# Patient Record
Sex: MALE | Race: BLACK OR AFRICAN AMERICAN | NOT HISPANIC OR LATINO | Employment: OTHER | URBAN - METROPOLITAN AREA
[De-identification: names, ages, dates, MRNs, and addresses within clinical notes are randomized per-mention and may not be internally consistent; named-entity substitution may affect disease eponyms.]

---

## 2018-01-03 ENCOUNTER — HISTORICAL (OUTPATIENT)
Dept: ADMINISTRATIVE | Facility: HOSPITAL | Age: 53
End: 2018-01-03

## 2018-01-10 LAB
FINAL CULTURE: NORMAL
FINAL CULTURE: NORMAL

## 2019-11-27 ENCOUNTER — HISTORICAL (OUTPATIENT)
Dept: ADMINISTRATIVE | Facility: HOSPITAL | Age: 54
End: 2019-11-27

## 2019-11-27 LAB
ABS NEUT (OLG): 2.43 X10(3)/MCL (ref 2.1–9.2)
ALBUMIN SERPL-MCNC: 4 GM/DL (ref 3.4–5)
ALBUMIN/GLOB SERPL: 0.9 RATIO (ref 1.1–2)
ALP SERPL-CCNC: 116 UNIT/L (ref 45–117)
ALT SERPL-CCNC: 24 UNIT/L (ref 12–78)
AST SERPL-CCNC: 19 UNIT/L (ref 15–37)
BASOPHILS # BLD AUTO: 0 X10(3)/MCL (ref 0–0.2)
BASOPHILS NFR BLD AUTO: 1 %
BILIRUB SERPL-MCNC: 0.5 MG/DL (ref 0.2–1)
BILIRUBIN DIRECT+TOT PNL SERPL-MCNC: 0.1 MG/DL (ref 0–0.2)
BILIRUBIN DIRECT+TOT PNL SERPL-MCNC: 0.4 MG/DL
BUN SERPL-MCNC: 20 MG/DL (ref 7–18)
CALCIUM SERPL-MCNC: 8.6 MG/DL (ref 8.5–10.1)
CHLORIDE SERPL-SCNC: 107 MMOL/L (ref 98–107)
CO2 SERPL-SCNC: 30 MMOL/L (ref 21–32)
CREAT SERPL-MCNC: 1.1 MG/DL (ref 0.6–1.3)
EOSINOPHIL # BLD AUTO: 0.2 X10(3)/MCL (ref 0–0.9)
EOSINOPHIL NFR BLD AUTO: 3 %
ERYTHROCYTE [DISTWIDTH] IN BLOOD BY AUTOMATED COUNT: 13.2 % (ref 11.5–14.5)
GLOBULIN SER-MCNC: 4.3 GM/ML (ref 2.3–3.5)
GLUCOSE SERPL-MCNC: 116 MG/DL (ref 74–106)
HCT VFR BLD AUTO: 49.6 % (ref 40–51)
HGB BLD-MCNC: 16 GM/DL (ref 13.5–17.5)
IMM GRANULOCYTES # BLD AUTO: 0.01 10*3/UL
IMM GRANULOCYTES NFR BLD AUTO: 0 %
LYMPHOCYTES # BLD AUTO: 2.1 X10(3)/MCL (ref 0.6–4.6)
LYMPHOCYTES NFR BLD AUTO: 39 %
MCH RBC QN AUTO: 30.4 PG (ref 26–34)
MCHC RBC AUTO-ENTMCNC: 32.3 GM/DL (ref 31–37)
MCV RBC AUTO: 94.3 FL (ref 80–100)
MONOCYTES # BLD AUTO: 0.6 X10(3)/MCL (ref 0.1–1.3)
MONOCYTES NFR BLD AUTO: 11 %
NEUTROPHILS # BLD AUTO: 2.43 X10(3)/MCL (ref 2.1–9.2)
NEUTROPHILS NFR BLD AUTO: 46 %
PLATELET # BLD AUTO: 193 X10(3)/MCL (ref 130–400)
PMV BLD AUTO: 11.1 FL (ref 7.4–10.4)
POTASSIUM SERPL-SCNC: 4.4 MMOL/L (ref 3.5–5.1)
PROT SERPL-MCNC: 8.3 GM/DL (ref 6.4–8.2)
RBC # BLD AUTO: 5.26 X10(6)/MCL (ref 4.5–5.9)
SODIUM SERPL-SCNC: 140 MMOL/L (ref 136–145)
T4 FREE SERPL-MCNC: 0.8 NG/DL (ref 0.76–1.46)
TSH SERPL-ACNC: 1.18 MIU/L (ref 0.36–3.74)
WBC # SPEC AUTO: 5.4 X10(3)/MCL (ref 4.5–11)

## 2020-08-14 ENCOUNTER — HISTORICAL (OUTPATIENT)
Dept: ADMINISTRATIVE | Facility: HOSPITAL | Age: 55
End: 2020-08-14

## 2020-08-19 ENCOUNTER — HISTORICAL (OUTPATIENT)
Dept: SURGERY | Facility: HOSPITAL | Age: 55
End: 2020-08-19

## 2020-12-02 ENCOUNTER — HISTORICAL (OUTPATIENT)
Dept: RADIOLOGY | Facility: HOSPITAL | Age: 55
End: 2020-12-02

## 2021-06-30 ENCOUNTER — HISTORICAL (OUTPATIENT)
Dept: FAMILY MEDICINE | Facility: CLINIC | Age: 56
End: 2021-06-30

## 2021-06-30 LAB
ABS NEUT (OLG): 5.54 X10(3)/MCL (ref 2.1–9.2)
ALBUMIN SERPL-MCNC: 4 GM/DL (ref 3.5–5)
ALBUMIN/GLOB SERPL: 1 RATIO (ref 1.1–2)
ALP SERPL-CCNC: 107 UNIT/L (ref 40–150)
ALT SERPL-CCNC: <5 UNIT/L (ref 0–55)
AST SERPL-CCNC: 17 UNIT/L (ref 5–34)
BASOPHILS # BLD AUTO: 0 X10(3)/MCL (ref 0–0.2)
BASOPHILS NFR BLD AUTO: 0 %
BILIRUB SERPL-MCNC: 0.7 MG/DL
BILIRUBIN DIRECT+TOT PNL SERPL-MCNC: 0.3 MG/DL (ref 0–0.5)
BILIRUBIN DIRECT+TOT PNL SERPL-MCNC: 0.4 MG/DL (ref 0–0.8)
BUN SERPL-MCNC: 16.2 MG/DL (ref 8.4–25.7)
CALCIUM SERPL-MCNC: 9.1 MG/DL (ref 8.4–10.2)
CHLORIDE SERPL-SCNC: 104 MMOL/L (ref 98–107)
CHOLEST SERPL-MCNC: 139 MG/DL
CHOLEST/HDLC SERPL: 4 {RATIO} (ref 0–5)
CO2 SERPL-SCNC: 28 MMOL/L (ref 22–29)
CREAT SERPL-MCNC: 0.9 MG/DL (ref 0.73–1.18)
EOSINOPHIL # BLD AUTO: 0.1 X10(3)/MCL (ref 0–0.9)
EOSINOPHIL NFR BLD AUTO: 1 %
ERYTHROCYTE [DISTWIDTH] IN BLOOD BY AUTOMATED COUNT: 12.5 % (ref 11.5–14.5)
EST. AVERAGE GLUCOSE BLD GHB EST-MCNC: 116.9 MG/DL
GLOBULIN SER-MCNC: 4 GM/DL (ref 2.4–3.5)
GLUCOSE SERPL-MCNC: 105 MG/DL (ref 74–100)
HBA1C MFR BLD: 5.7 %
HCT VFR BLD AUTO: 48.1 % (ref 40–51)
HDLC SERPL-MCNC: 31 MG/DL (ref 35–60)
HGB BLD-MCNC: 16.1 GM/DL (ref 13.5–17.5)
IMM GRANULOCYTES # BLD AUTO: 0.02 10*3/UL
IMM GRANULOCYTES NFR BLD AUTO: 0 %
LDLC SERPL CALC-MCNC: 84 MG/DL (ref 50–140)
LYMPHOCYTES # BLD AUTO: 1.6 X10(3)/MCL (ref 0.6–4.6)
LYMPHOCYTES NFR BLD AUTO: 20 %
MCH RBC QN AUTO: 31 PG (ref 26–34)
MCHC RBC AUTO-ENTMCNC: 33.5 GM/DL (ref 31–37)
MCV RBC AUTO: 92.7 FL (ref 80–100)
MONOCYTES # BLD AUTO: 0.6 X10(3)/MCL (ref 0.1–1.3)
MONOCYTES NFR BLD AUTO: 8 %
NEUTROPHILS # BLD AUTO: 5.54 X10(3)/MCL (ref 2.1–9.2)
NEUTROPHILS NFR BLD AUTO: 70 %
NRBC BLD AUTO-RTO: 0 % (ref 0–0.2)
PLATELET # BLD AUTO: 188 X10(3)/MCL (ref 130–400)
PMV BLD AUTO: 11.7 FL (ref 7.4–10.4)
POTASSIUM SERPL-SCNC: 3.9 MMOL/L (ref 3.5–5.1)
PROT SERPL-MCNC: 8 GM/DL (ref 6.4–8.3)
PSA SERPL-MCNC: 0.36 NG/ML
RBC # BLD AUTO: 5.19 X10(6)/MCL (ref 4.5–5.9)
SODIUM SERPL-SCNC: 140 MMOL/L (ref 136–145)
TRIGL SERPL-MCNC: 118 MG/DL (ref 34–140)
TSH SERPL-ACNC: 0.65 UIU/ML (ref 0.35–4.94)
VLDLC SERPL CALC-MCNC: 24 MG/DL
WBC # SPEC AUTO: 7.9 X10(3)/MCL (ref 4.5–11)

## 2021-09-08 ENCOUNTER — HISTORICAL (OUTPATIENT)
Dept: RADIOLOGY | Facility: HOSPITAL | Age: 56
End: 2021-09-08

## 2022-04-10 ENCOUNTER — HISTORICAL (OUTPATIENT)
Dept: ADMINISTRATIVE | Facility: HOSPITAL | Age: 57
End: 2022-04-10
Payer: MEDICARE

## 2022-04-11 ENCOUNTER — HISTORICAL (OUTPATIENT)
Dept: ADMINISTRATIVE | Facility: HOSPITAL | Age: 57
End: 2022-04-11
Payer: MEDICARE

## 2022-04-11 ENCOUNTER — HISTORICAL (OUTPATIENT)
Dept: ADMINISTRATIVE | Facility: HOSPITAL | Age: 57
End: 2022-04-11

## 2022-04-28 VITALS
DIASTOLIC BLOOD PRESSURE: 75 MMHG | BODY MASS INDEX: 35.52 KG/M2 | WEIGHT: 253.75 LBS | HEIGHT: 71 IN | SYSTOLIC BLOOD PRESSURE: 109 MMHG | OXYGEN SATURATION: 97 %

## 2022-04-28 VITALS
BODY MASS INDEX: 35.52 KG/M2 | WEIGHT: 253.75 LBS | OXYGEN SATURATION: 97 % | DIASTOLIC BLOOD PRESSURE: 75 MMHG | SYSTOLIC BLOOD PRESSURE: 109 MMHG | HEIGHT: 71 IN

## 2022-05-03 NOTE — HISTORICAL [facility] CERNER
This is a historical note converted from [employer]. Formatting and pictures may have been removed.  Please reference [employer] for original formatting and attached multimedia. Indication for Surgery  painful symptomatic ventral?hernia, life-limiting  Preoperative Diagnosis  ventral hernia  Postoperative Diagnosis  same  Operation  Hybrid laparoscopic/open ventral hernia repair  Surgeon(s)  Staff: Haresh  Residents: Estefani Garcia  Assistant  None  Anesthesia  general  Estimated Blood Loss  10 cc  Urine Output  please see anesthesia record  Findings  4x6 oval mesh placement, open closure of hernia defect with ethibond  Specimen(s)  none  Complications  none  Technique  Preoperative consents reviewed. Patient taken to the OR and laid supine on the operating table. General anesthesia was induced. The abdomen was prepped and draped in the usual sterile fashion with chloraprep. A timeout was performed confirming the correct patient, operation, laterality, antibiotics and introductions made. We began by making a small 0.5 cm incision in the LUQ and placing a veress needle to insufflate the abdomen. Once insufflation was achieved we then placed an optical trocar into the ihe LUQ and gained access to the abdomen. Three other ports were placed under direct visualization, two on the right hemiabdomen and one more in the LLQ. We then using a harmonic scalpel and graspers the hernia was reduced and the fat around the hernia was excised. These pieces were placed over the liver for later retrieval. Once the defect was cleared of fat circumferentially it was measured to be approximately 2cm by 2cm.?The mesh was selected, a ventralight st 4x6 oval and placed into the abdomen through the defect. . We then desufflated the abdomen and made a semicircular incision above the hernia in the supraumbilical region. We dissected down to the defect and gained circumferential access around the defect. The hernia sac was then excised with  electrocautery. Then we placed interrupted ethibond sutures to close the defect. The abdomen was re-insufflated and the mesh unfurled. Then using the suture passer, the right and left, top and bottom sutures that were previously placed throught the mesh were grasped and the mesh was brought up to the abdominal wall. THe abdomen was again desufflated and the sutures tied. We re-insufflated the abdomen, and two of the sutures had torn through the fascia. Using the laparoscopic tacker the edges were tacked to the abdominal wall and the suture passer was passed through the abdominal wall and through the mesh and tied at the skin level. The mesh was flat to the abdominal wall. Again, using the laparoscopic tacker the rest of the mesh was tacked to the wall using countertraction on the abdominal wall. When the mesh was completely and securely tacked the abdomen was desufflated. The umbilicus was re-created with a 3-0 vicryl suture through our closed defect. The skin was closed with 4-0 pds suture. Local anesthetic was injected. Skin was cover with glue.  ?  All counts were correct x2 at the conclusion of the case.  ?  Dr. Hutson was present and scrubbed for the operation.   I agree with resident documentation. I was physically present, supervised resident, ?and discussed plan of care.  hybrid ventral hernia repair with mesh

## 2022-05-16 ENCOUNTER — OFFICE VISIT (OUTPATIENT)
Dept: FAMILY MEDICINE | Facility: CLINIC | Age: 57
End: 2022-05-16
Payer: MEDICARE

## 2022-05-16 VITALS
HEIGHT: 71 IN | TEMPERATURE: 98 F | SYSTOLIC BLOOD PRESSURE: 118 MMHG | HEART RATE: 68 BPM | OXYGEN SATURATION: 99 % | DIASTOLIC BLOOD PRESSURE: 80 MMHG | WEIGHT: 246 LBS | BODY MASS INDEX: 34.44 KG/M2 | RESPIRATION RATE: 18 BRPM

## 2022-05-16 DIAGNOSIS — G89.29 CHRONIC NECK PAIN: ICD-10-CM

## 2022-05-16 DIAGNOSIS — R25.1 TREMOR: ICD-10-CM

## 2022-05-16 DIAGNOSIS — R73.9 HYPERGLYCEMIA: Primary | ICD-10-CM

## 2022-05-16 DIAGNOSIS — M62.838 MUSCLE SPASM: Chronic | ICD-10-CM

## 2022-05-16 DIAGNOSIS — M54.2 CHRONIC NECK PAIN: ICD-10-CM

## 2022-05-16 DIAGNOSIS — I48.91 ATRIAL FIBRILLATION WITH RAPID VENTRICULAR RESPONSE: ICD-10-CM

## 2022-05-16 DIAGNOSIS — F40.10 SOCIAL ANXIETY DISORDER: ICD-10-CM

## 2022-05-16 PROBLEM — R42 DIZZINESS: Status: ACTIVE | Noted: 2022-05-16

## 2022-05-16 PROBLEM — K21.00 GASTROESOPHAGEAL REFLUX DISEASE WITH ESOPHAGITIS: Status: ACTIVE | Noted: 2022-05-16

## 2022-05-16 PROBLEM — M54.50 CHRONIC LOW BACK PAIN: Status: ACTIVE | Noted: 2022-05-16

## 2022-05-16 PROBLEM — E66.9 OBESITY: Status: ACTIVE | Noted: 2022-05-16

## 2022-05-16 PROBLEM — G20.A1 PARKINSON'S DISEASE: Status: ACTIVE | Noted: 2022-05-16

## 2022-05-16 PROBLEM — F32.A DEPRESSIVE DISORDER: Status: ACTIVE | Noted: 2022-05-16

## 2022-05-16 PROBLEM — M25.559 ARTHRALGIA OF HIP: Status: ACTIVE | Noted: 2022-05-16

## 2022-05-16 PROBLEM — K42.9 UMBILICAL HERNIA: Status: ACTIVE | Noted: 2022-05-16

## 2022-05-16 PROCEDURE — 99214 OFFICE O/P EST MOD 30 MIN: CPT | Mod: S$PBB,,, | Performed by: FAMILY MEDICINE

## 2022-05-16 PROCEDURE — 99215 OFFICE O/P EST HI 40 MIN: CPT | Mod: PBBFAC | Performed by: FAMILY MEDICINE

## 2022-05-16 PROCEDURE — 99214 PR OFFICE/OUTPT VISIT, EST, LEVL IV, 30-39 MIN: ICD-10-PCS | Mod: S$PBB,,, | Performed by: FAMILY MEDICINE

## 2022-05-16 RX ORDER — DICLOFENAC SODIUM 50 MG/1
TABLET, DELAYED RELEASE ORAL
COMMUNITY
Start: 2021-12-16 | End: 2022-05-16

## 2022-05-16 RX ORDER — PANTOPRAZOLE SODIUM 40 MG/1
40 TABLET, DELAYED RELEASE ORAL DAILY
COMMUNITY
Start: 2022-04-11 | End: 2023-02-17 | Stop reason: SDUPTHER

## 2022-05-16 RX ORDER — BENZOCAINE/MENTHOL
LOZENGE MUCOUS MEMBRANE
COMMUNITY
Start: 2021-05-09

## 2022-05-16 RX ORDER — BUSPIRONE HYDROCHLORIDE 15 MG/1
15 TABLET ORAL 3 TIMES DAILY
COMMUNITY
Start: 2022-02-04

## 2022-05-16 RX ORDER — CARBIDOPA AND LEVODOPA 25; 100 MG/1; MG/1
TABLET ORAL
COMMUNITY
Start: 2022-04-18 | End: 2022-09-02 | Stop reason: SDUPTHER

## 2022-05-16 RX ORDER — ASPIRIN 81 MG/1
81 TABLET ORAL
COMMUNITY
Start: 2021-12-09 | End: 2022-06-09

## 2022-05-16 RX ORDER — MELOXICAM 7.5 MG/1
TABLET ORAL
COMMUNITY
Start: 2022-04-11 | End: 2022-05-16 | Stop reason: ALTCHOICE

## 2022-05-16 RX ORDER — ACETAMINOPHEN 325 MG/1
650 TABLET ORAL EVERY 8 HOURS PRN
Qty: 100 TABLET | Refills: 5 | Status: SHIPPED | OUTPATIENT
Start: 2022-05-16 | End: 2022-06-15

## 2022-05-16 RX ORDER — DILTIAZEM HYDROCHLORIDE 120 MG/1
120 CAPSULE, COATED, EXTENDED RELEASE ORAL DAILY
COMMUNITY
Start: 2022-03-17 | End: 2022-06-09

## 2022-05-16 RX ORDER — ZONISAMIDE 50 MG/1
50 CAPSULE ORAL DAILY
COMMUNITY
Start: 2022-02-15 | End: 2022-07-05 | Stop reason: SDUPTHER

## 2022-05-16 RX ORDER — BENZTROPINE MESYLATE 1 MG/1
1 TABLET ORAL 2 TIMES DAILY
COMMUNITY
Start: 2022-04-18 | End: 2022-07-05 | Stop reason: SDUPTHER

## 2022-05-16 RX ORDER — METHOCARBAMOL 500 MG/1
500 TABLET, FILM COATED ORAL 2 TIMES DAILY PRN
Qty: 40 TABLET | Refills: 0 | Status: SHIPPED | OUTPATIENT
Start: 2022-05-16 | End: 2022-05-26

## 2022-05-16 RX ORDER — MECLIZINE HCL 12.5 MG 12.5 MG/1
12.5 TABLET ORAL DAILY PRN
COMMUNITY
Start: 2022-04-11 | End: 2023-07-14

## 2022-05-16 RX ORDER — MELOXICAM 15 MG/1
15 TABLET ORAL DAILY
Qty: 30 TABLET | Refills: 5 | Status: SHIPPED | OUTPATIENT
Start: 2022-05-16 | End: 2022-06-15

## 2022-05-16 NOTE — PROGRESS NOTES
[facility]  [address] [address]  Phone: [phone]       Subjective:      Patient ID: Reza Wellington is a 56 y.o. male who presents to [facility] who  has a past medical history of Anxiety, Atrial fibrillation, Chronic cervical pain, Chronic lumbar pain, Depression, Obesity, unspecified, Parkinson's disease, and Umbilical hernia.     Chief Complaint: Neck Pain    56 Years Black or  Male presents  for f/u on social anxiety disorder with a PMHx of  A.  Fib with RVR, Parkinson's disease, chronic cervical pain, chronic lumbar pain and IGT/ hyperglycemia.       Social anxiety:   -Patient has a h/o social anxiety.     -Patient states that he has noticed that he gets nervous when he is around a crowd of people.   -Pt states social anxiety is decreasing with propranolol but the patient was taken off of propanolol by neurology   - Pt denies suicidal and homicidal ideations.  -Pt requiring something for social anxiety since the propranolol was discontinued.        Parkinson's disease/tremors:   Reviewed Neurology note (2/15/2022) depicting patient has mixed action and resting tremor with recommendations to stop propranolol and gabapentin and continue with sinemet and benztropine and start zonisamide for kinetic tremor. Pt states the tremors have diminished with the sinemet and zonisamide.      Rt hip pain:   -Duration: 1 yr   -Worse with ambulation and reaching for things to the right  -  04/11/2022 XR hip with pelvis:  Degenerative changes.   -no trauma  -Reviewed ortho note (4/19/2022): Pt  Was treated with CSI to greater trochanter on right. NSAIDS prn. PT x 12 weeks and activity modification; WIll consider MRI if no improvement at folllow up      Cervical spine pain with radiculopathy:  Chronic neck and back pain with radiculopathy ( left leg and b/l hands): Was seeing Dr. Burgess  Nikky and Saleem Melendez for a while for pain.  ICS for a while. Pt has pain 6/10 today. Cerival pain: Turning his head makes it worse. Nothing  makes it better- uses cold pack and hot packs. He had PT  after the accident.   Lumbar spine worse with standing or sitting too long.  Reviewed last C-spine and L-Spine (6/30/2021).    Radiology Report  XR Spine Cervical 2 or 3 Views     HISTORY: Cervical spine radiograph 11/27/2012     COMPARISON:   None.     FINDINGS:  The cervical spine is visualized from the skull base to C6.     No acute displaced fractures, compression deformities or subluxations.  Mild disc space narrowing from C4 to C6, otherwise disc spaces  maintained.  No blastic or lytic lesions.  Soft tissues within normal limits.  Lung apices clear.     IMPRESSION:     No acute osseous abnormality.       Signature Line  Electronically Signed By: Mary Garza MD  Date/Time Signed: 07/01/2021 13:05    * Final Report *    Reason For Exam    Radiology Report  EXAMINATION  XR Spine Lumbar 2 or 3 Views     INDICATION  Low back pain     Comparison: None available     FINDINGS  There are 5 nonrib-bearing lumbar vertebral bodies. Alignment is  normal without subluxation. Vertebral body heights and disc spaces are  maintained. There are small multilevel marginal osteophytes. The soft  tissues are unremarkable.     IMPRESSION  1.  No acute abnormality identified.  2.  Mild degenerative changes of the lumbar spine.    Signature Line  Electronically Signed By: Cayla Garcia MD  Date/Time Signed: 07/01/2021 13:08          Review of patient's allergies indicates:  No Known Allergies     Past Medical History:   Diagnosis Date    Anxiety     Atrial fibrillation     Chronic cervical pain     Chronic lumbar pain     Depression     Obesity, unspecified     Parkinson's disease     Umbilical hernia         Past Surgical History:   Procedure Laterality Date    HERNIA REPAIR      KNEE SURGERY          Review of  Systems   Constitutional: Negative for chills and fever.   HENT: Negative for congestion, hearing loss, postnasal drip, rhinorrhea and sore throat.    Eyes: Negative for visual disturbance.   Respiratory: Negative for shortness of breath and wheezing.    Cardiovascular: Negative for chest pain, palpitations and leg swelling.   Gastrointestinal: Negative for abdominal pain, constipation, diarrhea, nausea and vomiting.   Endocrine: Negative for cold intolerance, heat intolerance, polydipsia, polyphagia and polyuria.   Genitourinary: Negative for decreased urine volume, dysuria, flank pain, hematuria and urgency.   Musculoskeletal: Positive for neck pain. Negative for arthralgias and myalgias.   Skin: Negative for rash.   Neurological: Positive for tremors. Negative for dizziness, weakness, numbness and headaches.   Hematological: Negative for adenopathy.   Psychiatric/Behavioral: Negative for behavioral problems, dysphoric mood and suicidal ideas. The patient is not nervous/anxious.    All other systems reviewed and are negative.         Objective:        Physical Exam  Vitals and nursing note reviewed.   Constitutional:       General: He is not in acute distress.     Appearance: Normal appearance. He is normal weight. He is not ill-appearing.   HENT:      Head: Normocephalic and atraumatic.      Right Ear: Tympanic membrane, ear canal and external ear normal.      Left Ear: Tympanic membrane, ear canal and external ear normal.   Eyes:      Pupils: Pupils are equal, round, and reactive to light.   Cardiovascular:      Rate and Rhythm: Normal rate and regular rhythm.      Pulses: Normal pulses.      Heart sounds: Normal heart sounds. No murmur heard.    No friction rub. No gallop.   Pulmonary:      Effort: Pulmonary effort is normal.      Breath sounds: Normal breath sounds. No wheezing, rhonchi or rales.   Abdominal:      General: Abdomen is flat. Bowel sounds are normal.      Palpations: Abdomen is soft.       "Tenderness: There is no abdominal tenderness.      Hernia: No hernia is present.   Musculoskeletal:         General: No swelling. Normal range of motion.      Cervical back: Normal range of motion and neck supple.      Comments: Tenderness to palpation of the trapezius muscle; limited range of motion; no signs or symptoms of meningitis infection.   Skin:     General: Skin is warm and dry.      Capillary Refill: Capillary refill takes less than 2 seconds.   Neurological:      General: No focal deficit present.      Mental Status: He is alert and oriented to person, place, and time. Mental status is at baseline.      Cranial Nerves: No cranial nerve deficit.      Sensory: No sensory deficit.      Gait: Gait normal.      Comments: Mild tremor   Psychiatric:         Mood and Affect: Mood normal.         Behavior: Behavior normal.         Thought Content: Thought content normal.         Judgment: Judgment normal.          /80 (BP Location: Right arm)   Pulse 68   Temp 97.9 °F (36.6 °C) (Oral)   Resp 18   Ht 5' 11" (1.803 m)   Wt 111.6 kg (246 lb)   SpO2 99%   BMI 34.31 kg/m²                No visits with results within 1 Month(s) from this visit.   Latest known visit with results is:   Historical on 06/30/2021   Component Date Value Ref Range Status    Alanine Aminotransferase 06/30/2021 <5  0 - 55 unit/L Final    Albumin/Globulin Ratio 06/30/2021 1.0 (A) 1.1 - 2.0 ratio Final    Albumin Level 06/30/2021 4.0  3.5 - 5.0 gm/dL Final    Alkaline Phosphatase 06/30/2021 107  40 - 150 unit/L Final    Aspartate Aminotransferase 06/30/2021 17  5 - 34 unit/L Final    Blood Urea Nitrogen 06/30/2021 16.2  8.4 - 25.7 mg/dL Final    Bilirubin Total 06/30/2021 0.7  <<=1.5 mg/dL Final    Bilirubin Direct 06/30/2021 0.3  0.0 - 0.5 mg/dL Final    Bilirubin Indirect 06/30/2021 0.40  0.00 - 0.80 mg/dL Final    Chloride 06/30/2021 104  98 - 107 mmol/L Final    Carbon Dioxide 06/30/2021 28  22 - 29 mmol/L Final    " Calcium Level Total 06/30/2021 9.1  8.4 - 10.2 mg/dL Final    Creatinine 06/30/2021 0.90  0.73 - 1.18 mg/dL Final    Glucose Level 06/30/2021 105 (A) 74 - 100 mg/dL Final    Globulin 06/30/2021 4.0 (A) 2.4 - 3.5 gm/dL Final    Sodium Level 06/30/2021 140  136 - 145 mmol/L Final    Potassium Level 06/30/2021 3.9  3.5 - 5.1 mmol/L Final    Protein Total 06/30/2021 8.0  6.4 - 8.3 gm/dL Final    Cholesterol Total 06/30/2021 139  <<=200 mg/dL Final    Cholesterol/HDL Ratio 06/30/2021 4  0 - 5 Final    HDL Cholesterol 06/30/2021 31 (A) 35 - 60 mg/dL Final    LDL Cholesterol 06/30/2021 84.00  50.00 - 140.00 mg/dL Final    Triglyceride 06/30/2021 118  34 - 140 mg/dL Final    Very Low Density Lipoprotein 06/30/2021 24   Final    Estimated Average Glucose 06/30/2021 116.9  mg/dL Final    Hemoglobin A1c 06/30/2021 5.7  <<=7.0 % Final    Prostate Specific Antigen 06/30/2021 0.36  <<=4.00 ng/mL Final    Thyroid Stimulating Hormone 06/30/2021 0.6476  0.3500 - 4.9400 uIU/mL Final    Estimated GFR- 06/30/2021 >105  >>=90 Final    Estimated GFR-Non  06/30/2021 92  >>=90 mL/min/1.73 m2 Final    Abs Neut 06/30/2021 5.54  2.10 - 9.20 x10(3)/mcL Final    Hgb 06/30/2021 16.1  13.5 - 17.5 gm/dL Final    Hct 06/30/2021 48.1  40.0 - 51.0 % Final    MCV 06/30/2021 92.7  80.0 - 100.0 fL Final    MCH 06/30/2021 31.0  26.0 - 34.0 pg Final    MCHC 06/30/2021 33.5  31.0 - 37.0 gm/dL Final    MPV 06/30/2021 11.7 (A) 7.4 - 10.4 fL Final    WBC 06/30/2021 7.9  4.5 - 11.0 x10(3)/mcL Final    RBC 06/30/2021 5.19  4.50 - 5.90 x10(6)/mcL Final    RDW 06/30/2021 12.5  11.5 - 14.5 % Final    Platelet 06/30/2021 188  130 - 400 x10(3)/mcL Final    Basophil Auto 06/30/2021 0  % Final    Neut # 06/30/2021 5.54  2.10 - 9.20 x10(3)/mcL Final    Lymph # 06/30/2021 1.6  0.6 - 4.6 x10(3)/mcL Final    Abs Mono 06/30/2021 0.6  0.1 - 1.3 x10(3)/mcL Final    Abs Eos 06/30/2021 0.1  0.0 - 0.9 x10(3)/mcL  Final    Abs Baso 06/30/2021 0.0  0.0 - 0.2 x10(3)/mcL Final    NRBC% 06/30/2021 0.0  0.0 - 0.2 % Final    Neut % 06/30/2021 70  % Final    Lymph % 06/30/2021 20  % Final    Mono Auto 06/30/2021 8  % Final    Eos Auto 06/30/2021 1  % Final    IG# 06/30/2021 0.020   Final    IG% 06/30/2021 0  % Final        Assessment and Plan:       Start methocarbamol for cervical and lumbar spine pain.  Propranol working for social anxiety and tremors but discontinued by Neurlogy  Start buspirone 15 mg TID.  Discussed labs with the patient above.    Problem List Items Addressed This Visit        Neuro    Tremor  Continue recommendations from the [facility] neurology:  Continue Sinemet and zonisamide with benztropine.         Psychiatric    Social anxiety disorder  Continue BuSpar 15 mg p.o. t.i.d.       Cardiac/Vascular    Atrial fibrillation with rapid ventricular response  Continue metoprolol succinate 25 mg daily and Crestor 10 mg daily       Endocrine    Hyperglycemia - Primary       Orthopedic    Muscle spasm (Chronic)    Relevant Medications    Start methocarbamoL (ROBAXIN) 500 MG Tab    Chronic neck pain    Relevant Medications    Continue meloxicam (MOBIC) 15 MG tablet    Continueacetaminophen (TYLENOL) 325 MG tablet    start methocarbamoL (ROBAXIN) 500 MG Tab    Other Relevant Orders    X-Ray Cervical Spine Complete 5 view    Ambulatory referral/consult to Neurology-external referral for EMG to determine where patient is having a cervical radiculopathy        Previous medical history/lab work/radiology reviewed and considered during medical management decisions.   Medication list reviewed and medication reconciliation performed.  Patient was provided  and care about his/her current diagnosis (es) and medications including risk/benefit and side effects/adverse events, over the counter medication uses/doses, home self-care and contact precautions,  and red flags and indications for when to seek immediate medical  attention.   Patient was advised to continue compliance with current medication list and medical recommendations.  Recommended/ Advised continued compliance with recommended eating habits/ diets for medical conditions and exercise 150 minutes/ week (if possible) for medical condition (s).  Continue following up with specialist:  [facility] Neu[facility], cardiology and orthopedics  Educational handouts and instructions on selected disease management in AVS (After Visit Summary).  All of the patient's questions were answered to patient's satisfaction.   The patient was receptive, expressed verbal understanding and agreement the above plan.      Portions of this encounter dictated using M*Modal Fluency Direct voice recognition software. Please excuse any typographical errors that might have transpired.      Follow up with  administrative management referral to Internal Medicine or Family Medicine (as of 7/8/2022) for  medical conditions above in 2 months.  Most recent labs reviewed and are within normal limits.        Leydi Marrero MD

## 2022-05-17 ENCOUNTER — PATIENT MESSAGE (OUTPATIENT)
Dept: ADMINISTRATIVE | Facility: HOSPITAL | Age: 57
End: 2022-05-17
Payer: MEDICARE

## 2022-05-28 ENCOUNTER — PATIENT MESSAGE (OUTPATIENT)
Dept: ADMINISTRATIVE | Facility: HOSPITAL | Age: 57
End: 2022-05-28
Payer: MEDICARE

## 2022-05-28 DIAGNOSIS — Z12.11 SCREENING FOR COLON CANCER: ICD-10-CM

## 2022-06-09 ENCOUNTER — OFFICE VISIT (OUTPATIENT)
Dept: CARDIOLOGY | Facility: CLINIC | Age: 57
End: 2022-06-09
Payer: MEDICARE

## 2022-06-09 VITALS
BODY MASS INDEX: 33.43 KG/M2 | HEIGHT: 71 IN | HEART RATE: 69 BPM | SYSTOLIC BLOOD PRESSURE: 124 MMHG | TEMPERATURE: 98 F | WEIGHT: 238.75 LBS | RESPIRATION RATE: 18 BRPM | DIASTOLIC BLOOD PRESSURE: 81 MMHG | OXYGEN SATURATION: 96 %

## 2022-06-09 DIAGNOSIS — I48.0 PAROXYSMAL ATRIAL FIBRILLATION: Primary | ICD-10-CM

## 2022-06-09 DIAGNOSIS — G20.A1 PARKINSON DISEASE: ICD-10-CM

## 2022-06-09 PROCEDURE — 93005 ELECTROCARDIOGRAM TRACING: CPT

## 2022-06-09 PROCEDURE — 99214 OFFICE O/P EST MOD 30 MIN: CPT | Mod: PBBFAC | Performed by: INTERNAL MEDICINE

## 2022-06-09 RX ORDER — METOPROLOL SUCCINATE 25 MG/1
25 TABLET, EXTENDED RELEASE ORAL DAILY
Qty: 30 TABLET | Refills: 11 | Status: SHIPPED | OUTPATIENT
Start: 2022-06-09 | End: 2023-07-27 | Stop reason: SDUPTHER

## 2022-06-09 RX ORDER — ROSUVASTATIN CALCIUM 10 MG/1
10 TABLET, COATED ORAL DAILY
Qty: 90 TABLET | Refills: 3 | Status: SHIPPED | OUTPATIENT
Start: 2022-06-09 | End: 2023-07-27 | Stop reason: SDUPTHER

## 2022-06-09 NOTE — PROGRESS NOTES
[facility]    Subjective:        Reza Wellington is a 56 y.o. male who  has a past medical history of Anxiety, Atrial fibrillation, Chronic cervical pain, Chronic lumbar pain, Depression, Obesity, unspecified, Parkinson's disease, and Umbilical hernia.  He presents to clinic today for follow-up of chronic medical conditions.  Patient said in May of last year that he had palpitations, and uncomfortableness that could not go way.  Patient denies any chest pain burning pressure-like symptoms during these episodes.  Patient went to the hospital was found to have atrial fibrillation was started on a Cardizem drip and was converted back into sinus rhythm.  Patient has not had any palpitations since that episode.  Patient denies any anginal chest pain, paroxysmal nocturnal dyspnea, orthopnea, limb claudication symptoms.  Patient is able to complete his daily activities of life.  Of note patient says that he feels dizzy sometimes when he ambulates from a sitting position.  In addition patient says that his PCP is thinking that it is vertigo.    Review of Systems:  10 point ROS negative except for HPI    Objective:   Vital Signs:  Vitals:    06/09/22 0818   BP: 124/81   Pulse: 69   Resp: 18   Temp: 98.4 °F (36.9 °C)        Body mass index is 33.32 kg/m².     General:  Well developed, well nourished, no acute respiratory distress  Head: Normocephalic, atraumatic  Eyes: PERRL, EOMI, anicteric sclera  Throat: No posterior pharyngeal erythema or exudate, no tonsillar exudate  Neck: supple, normal ROM, no thyromegaly   CVS:  RRR, S1 and S2 normal, no murmurs, no added heart sounds, rubs, gallops, 2+ peripheral pulses  Resp:  Lungs clear to auscultation bilaterally, no wheezes, rales, or rhonci  GI:  Abdomen soft, non-tender, non-distended, normoactive bowel sounds  MSK:  No muscle atrophy, cyanosis, +1 peripheral edema, full range of motion  Skin:  No rashes, ulcers, erythema  Neuro:  Alert and oriented x3, pill  rolling tremor, CNII-XII grossly intact  Psych:  Appropriate mood and affect         Laboratory:  Lab Results   Component Value Date    WBC 7.9 06/30/2021    HGB 16.1 06/30/2021    HCT 48.1 06/30/2021     06/30/2021    MCV 92.7 06/30/2021    RDW 12.5 06/30/2021     Lab Results   Component Value Date    HGBA1C 5.7 06/30/2021    .9 06/30/2021    CREATININE 0.90 06/30/2021    Lab Results   Component Value Date     06/30/2021    K 3.9 06/30/2021    CO2 28 06/30/2021    BUN 16.2 06/30/2021    CREATININE 0.90 06/30/2021    CALCIUM 9.1 06/30/2021     Lab Results   Component Value Date    TSH 0.6476 06/30/2021    [id number] 0.80 11/27/2019          Anemia: No results found for: IRON, TIBC, FERRITIN, [id number], FOLATE    Current Medications:  Current Outpatient Medications   Medication Instructions    acetaminophen (TYLENOL) 650 mg, Oral, Every 8 hours PRN    aspirin (ECOTRIN) 81 mg, Oral    benztropine (COGENTIN) 1 mg, Oral, 2 times daily    busPIRone (BUSPAR) 15 mg, Oral, 3 times daily    carbidopa-levodopa  mg (SINEMET)  mg per tablet TAKE TWO TABLETS BY MOUTH EVERY MORNING, TAKE TWO TABLETS AT NOON, TAKE ONE TABLET EVERY EVENING & TAKE ONE TABLET AT 9pm    diltiaZEM (CARDIZEM CD) 120 mg, Oral, Daily    meclizine (ANTIVERT) 12.5 mg tablet TAKE ONE TABLET BY MOUTH THREE TIMES DAILY AS NEEDED dizziness    meloxicam (MOBIC) 15 mg, Oral, Daily, With food and plenty of water. Take 30 minutes after Protonix to prevent gastritis    oxymetazoline (VICKS SINEX ULTRA FINE MIST 12) 0.05 % Mist   2 spray(s), PRN PRN congestion, 0 Refill(s)    pantoprazole (PROTONIX) 40 mg, Oral, Daily    zonisamide (ZONEGRAN) 50 mg, Oral, Daily        Assessment and Plan:        Health Maintenance   Topic Date Due    Hepatitis C Screening  Never done    Lipid Panel  06/30/2026    TETANUS VACCINE  06/29/2031      Paroxymal atrial fibrillation:  -Chadsvasc is 0. Stop aspirin   -ASCVD is 5.5 %  -Patient is  currently on dilt 120  -Start crestor 10  -Switch from dilt to toprol xl (patient having dizziness)  -Patient will follow with neurology for dizziness and gastroparesis       Bijal Stone MD
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
## 2022-07-05 ENCOUNTER — OFFICE VISIT (OUTPATIENT)
Dept: NEUROLOGY | Facility: CLINIC | Age: 57
End: 2022-07-05
Payer: MEDICARE

## 2022-07-05 VITALS
HEIGHT: 71 IN | SYSTOLIC BLOOD PRESSURE: 135 MMHG | OXYGEN SATURATION: 97 % | WEIGHT: 240.06 LBS | TEMPERATURE: 98 F | BODY MASS INDEX: 33.61 KG/M2 | DIASTOLIC BLOOD PRESSURE: 85 MMHG | HEART RATE: 65 BPM | RESPIRATION RATE: 20 BRPM

## 2022-07-05 DIAGNOSIS — R25.1 FUNCTIONAL TREMOR: ICD-10-CM

## 2022-07-05 DIAGNOSIS — R25.9 MIXED ACTION AND RESTING TREMOR: ICD-10-CM

## 2022-07-05 DIAGNOSIS — G20.A1 PARKINSON'S DISEASE: Primary | ICD-10-CM

## 2022-07-05 PROCEDURE — 99215 OFFICE O/P EST HI 40 MIN: CPT | Mod: S$PBB,,, | Performed by: PSYCHIATRY & NEUROLOGY

## 2022-07-05 PROCEDURE — 99215 PR OFFICE/OUTPT VISIT, EST, LEVL V, 40-54 MIN: ICD-10-PCS | Mod: S$PBB,,, | Performed by: PSYCHIATRY & NEUROLOGY

## 2022-07-05 PROCEDURE — 99213 OFFICE O/P EST LOW 20 MIN: CPT | Mod: PBBFAC | Performed by: PSYCHIATRY & NEUROLOGY

## 2022-07-05 RX ORDER — BENZTROPINE MESYLATE 0.5 MG/1
0.5 TABLET ORAL 2 TIMES DAILY
Qty: 60 TABLET | Refills: 4 | Status: SHIPPED | OUTPATIENT
Start: 2022-07-05 | End: 2022-10-05 | Stop reason: SDUPTHER

## 2022-07-05 RX ORDER — ZONISAMIDE 100 MG/1
100 CAPSULE ORAL DAILY
Qty: 30 CAPSULE | Refills: 4 | Status: SHIPPED | OUTPATIENT
Start: 2022-07-05 | End: 2022-10-05

## 2022-07-12 LAB — HEMOCCULT STL QL IA: NORMAL

## 2022-07-13 ENCOUNTER — OFFICE VISIT (OUTPATIENT)
Dept: INTERNAL MEDICINE | Facility: CLINIC | Age: 57
End: 2022-07-13
Payer: MEDICARE

## 2022-07-13 VITALS
BODY MASS INDEX: 33.46 KG/M2 | SYSTOLIC BLOOD PRESSURE: 133 MMHG | RESPIRATION RATE: 18 BRPM | DIASTOLIC BLOOD PRESSURE: 85 MMHG | TEMPERATURE: 98 F | HEIGHT: 71 IN | HEART RATE: 63 BPM | WEIGHT: 239 LBS

## 2022-07-13 DIAGNOSIS — R42 DIZZINESS: Primary | Chronic | ICD-10-CM

## 2022-07-13 DIAGNOSIS — I48.91 ATRIAL FIBRILLATION WITH RAPID VENTRICULAR RESPONSE: Chronic | ICD-10-CM

## 2022-07-13 DIAGNOSIS — K21.00 GASTROESOPHAGEAL REFLUX DISEASE WITH ESOPHAGITIS, UNSPECIFIED WHETHER HEMORRHAGE: Chronic | ICD-10-CM

## 2022-07-13 DIAGNOSIS — R25.1 TREMOR: ICD-10-CM

## 2022-07-13 DIAGNOSIS — F41.8 DEPRESSION WITH ANXIETY: Chronic | ICD-10-CM

## 2022-07-13 DIAGNOSIS — G20.A1 PARKINSON'S DISEASE: Chronic | ICD-10-CM

## 2022-07-13 DIAGNOSIS — Z12.12 ENCOUNTER FOR COLORECTAL CANCER SCREENING: ICD-10-CM

## 2022-07-13 DIAGNOSIS — E66.09 CLASS 1 OBESITY DUE TO EXCESS CALORIES WITH SERIOUS COMORBIDITY AND BODY MASS INDEX (BMI) OF 33.0 TO 33.9 IN ADULT: Chronic | ICD-10-CM

## 2022-07-13 DIAGNOSIS — Z12.11 ENCOUNTER FOR COLORECTAL CANCER SCREENING: ICD-10-CM

## 2022-07-13 DIAGNOSIS — G25.9 EXTRAPYRAMIDAL AND MOVEMENT DISORDER, UNSPECIFIED: ICD-10-CM

## 2022-07-13 PROBLEM — E66.811 CLASS 1 OBESITY DUE TO EXCESS CALORIES WITH SERIOUS COMORBIDITY AND BODY MASS INDEX (BMI) OF 33.0 TO 33.9 IN ADULT: Chronic | Status: ACTIVE | Noted: 2022-05-16

## 2022-07-13 PROBLEM — M62.838 MUSCLE SPASM: Status: ACTIVE | Noted: 2022-05-16

## 2022-07-13 PROBLEM — F40.10 SOCIAL ANXIETY DISORDER: Status: RESOLVED | Noted: 2022-05-16 | Resolved: 2022-07-13

## 2022-07-13 PROBLEM — R73.9 HYPERGLYCEMIA: Status: RESOLVED | Noted: 2022-05-16 | Resolved: 2022-07-13

## 2022-07-13 PROCEDURE — 99214 PR OFFICE/OUTPT VISIT, EST, LEVL IV, 30-39 MIN: ICD-10-PCS | Mod: S$PBB,,, | Performed by: NURSE PRACTITIONER

## 2022-07-13 PROCEDURE — 99215 OFFICE O/P EST HI 40 MIN: CPT | Mod: PBBFAC | Performed by: NURSE PRACTITIONER

## 2022-07-13 PROCEDURE — 99214 OFFICE O/P EST MOD 30 MIN: CPT | Mod: S$PBB,,, | Performed by: NURSE PRACTITIONER

## 2022-07-13 NOTE — ASSESSMENT & PLAN NOTE
BMI: 33.46  TSH: 0.73  Vitamin D level: ordered  HgbA1c: 5.8  Weight Loss Encouraged  Increase Physical Activity

## 2022-07-13 NOTE — PROGRESS NOTES
Subjective:       Patient ID: Reza Wellington is a 56 y.o. male.    Chief Complaint: Establish Care (Former [facility] patient. C/O back pain.)    Patient has diagnosis of A-Fib, Parkinson's Disease, Anxiety/Depression, Chronic Neck/Lumbar Pain, Obesity, Hx of Umbilical hernia. Patient seen in clinic today to establish care. Patient last seen by Dr. Marrero in [facility] on 05/16/2022. Today, patient denies any acute complaints. States Buspar helping with anxiety and Robaxin helping with pain/muscle spasm.     Patient followed by Neurology Clinic. Last appointment on  07/05/2022. History of anxiety, depression, who is referred for tremor disorder that appears to be left hand predominant mixed resting and postural tremor without significant bradykinesia or paratonia, that improves with Sinemet challenge, consistent with Parkinson's with overflow action tremor. Will start weaning off of Neurontin and Benztropine. Has a mild function component to it.  During last visit, gabapentin was stopped along with propanolol.  Zonisamide 50 mg daily was started for kinetic tremor. Age of Onset - 52. Semiology/Progression - left hand tremor, with use and some times with rest, mostly when he is anxious. would tap his left leg. No progression to right hand. Not impeding day to day function. Doing much better since less stressed. MRI Brain +/- Franki 10/2017 - unremarkable. Patient to continue with Sinemet 25mg - 100mg @ 2 tab in AM, 2 tab in Noon, 1 tab in PM, 1 tab at 9PM, decrease Benztropine to 0.5 mg BID, increase Zonisamide to 100mg daily. Patient has follow up appointment scheduled for 10/05/2022.     Patient followed by Cardiology Clinic. Last appointment on 06/09/2022. Past medical history of Anxiety, Atrial fibrillation, Chronic cervical pain, Chronic lumbar pain, Depression, Obesity, Parkinson's disease, and Umbilical hernia. He presents to clinic today for follow-up of chronic medical conditions.  Patient said in May 2021, he had palpitations, and uncomfortableness that could not go way. Patient denies any chest pain burning pressure-like symptoms during these episodes. Patient went to the hospital was found to have atrial fibrillation was started on a Cardizem drip and was converted back into sinus rhythm. Patient has not had any palpitations since that episode. Patient denies any anginal chest pain, paroxysmal nocturnal dyspnea, orthopnea, limb claudication symptoms. Patient is able to complete his daily activities of life. Of note patient says that he feels dizzy sometimes when he ambulates from a sitting position. In addition patient says that his PCP is thinking that it is vertigo. Patient completed a Lexiscan stress test on 09/08/2021 which revealed no evidence of ischemia by electrocardiographic criteria at 89% of the maximum age-predicted heart rate, no reversible perfusion defects, normal left ventricular ejection fraction, no transient ischemic dilatation, and fair functional capacity. Echocardiogram completed on 05/09/2021 showed no left ventricular hypertrophy, normal left ventricular function, EF of 60-65%, L and R atrium were normal in size. Paroxymal atrial fibrillation: Chadsvasc is 0; Stop aspirin; ASCVD is 5.5 %; Patient is currently on dilt 120; Start crestor 10; Switch from dilt to toprol xl (patient having dizziness); Patient will follow with neurology for dizziness and gastroparesis. Patient to follow up in 6 months.     Patient followed by Orthopedic Clinic. Last appointment on 04/19/2022. Presents to ortho clinic for initial visit for right hip pain. Patient points to lateral hip. Patient reports insidious ionset approx 6 months ago. Had no reported falls or trauma. Reports pain along his greater trochanter. Has had improvement with NSAIDS previously. Has not had any CSIs of his right hip. Dx: Right greater trochanteric pain syndrome with moderate exacerbation, no significnt  degenrative changes to right hip joint; CRICKET AND FADIR negative; has tenderness to lateral aspect of greater trochater; Tendiopathy of the gluteus minimus and mild bursitis present on ultrasound exam. Treatment plan: CSI greater trochanter on right; NSAIDS prn; PT x 12 weeks and activity modification; WIll consider MRI if no improvement at folllow up. Weight Management is paramount: recommend at least 10 pounds weight loss. Procedure: discussed CSI injections as treatment options; since conservative measures did not improve symptoms patient consented for CSI today. Activity as tolerated; HEP to included aerobic conditioning with non-painful activity and ROM/STG exercises; proper footwear; assistive device to avoid limping. Therapy: formal PT/OT ordered. Medication: OTC or prescription NSAIDs; medication precautions given. RTC: PRN; call if any issues.       FIT: Cologuard ordered 07/13/2022  PSA: 0.4  Medicare Wellness: ordered    Review of Systems   Constitutional: Negative.    HENT: Negative.    Eyes: Negative.    Respiratory: Negative.    Cardiovascular: Negative.    Gastrointestinal: Negative.    Endocrine: Negative.    Genitourinary: Negative.    Musculoskeletal: Negative.    Integumentary:  Negative.   Allergic/Immunologic: Negative.    Neurological: Negative.    Hematological: Negative.    Psychiatric/Behavioral: Negative.          Objective:      Physical Exam  Vitals reviewed.   Constitutional:       Appearance: Normal appearance.   HENT:      Head: Normocephalic and atraumatic.      Mouth/Throat:      Mouth: Mucous membranes are moist.      Pharynx: Oropharynx is clear.   Eyes:      Extraocular Movements: Extraocular movements intact.      Conjunctiva/sclera: Conjunctivae normal.      Pupils: Pupils are equal, round, and reactive to light.   Cardiovascular:      Rate and Rhythm: Normal rate and regular rhythm.      Heart sounds: Normal heart sounds.   Pulmonary:      Effort: Pulmonary effort is normal.       Breath sounds: Normal breath sounds.   Abdominal:      General: Bowel sounds are normal.   Musculoskeletal:         General: Normal range of motion.      Cervical back: Normal range of motion.   Skin:     General: Skin is warm and dry.   Neurological:      Mental Status: He is alert and oriented to person, place, and time.      Motor: Tremor present.   Psychiatric:         Mood and Affect: Mood normal.         Behavior: Behavior normal.         Assessment:       Problem List Items Addressed This Visit        Neuro    Parkinson's disease (Chronic)     Patient followed by Neurology  Continue Benztropine 0.5 mg bid, Carbidopa-Levodopa  mg QID, Zonisamide 100 mg daily.            Tremor    Relevant Orders    Folate    Vitamin B12       Psychiatric    Depression with anxiety (Chronic)     Continue Buspirone 15 mg tid              Cardiac/Vascular    Atrial fibrillation with rapid ventricular response (Chronic)     Followed by Cardiology  Continue Metoprolol Succ 25 mg daily, Rosuvastatin 10 mg daily           Relevant Orders    Iron and TIBC    Ferritin       Endocrine    Class 1 obesity due to excess calories with serious comorbidity and body mass index (BMI) of 33.0 to 33.9 in adult (Chronic)     BMI: 33.46  TSH: 0.73  Vitamin D level: ordered  HgbA1c: 5.8  Weight Loss Encouraged  Increase Physical Activity           Relevant Orders    CBC Auto Differential    Comprehensive Metabolic Panel    Lipid Panel    Urinalysis, Reflex to Urine Culture Urine, Clean Catch    Microalbumin/Creatinine Ratio, Urine    Vitamin D       GI    Gastroesophageal reflux disease with esophagitis (Chronic)     Continue Pantoprazole 40 mg daily  Avoid spicy foods  Remain in an upright position for at least 30 minutes after consuming meals              Other    Dizziness - Primary (Chronic)     Continue Meclizine 12.5 mg daily as needed             Other Visit Diagnoses     Encounter for colorectal cancer screening        Relevant  Orders    Cologuard Screening (Multitarget Stool DNA)    Extrapyramidal and movement disorder, unspecified         Relevant Orders    Folate    Vitamin B12          Plan:    Patient to follow up in 6 months with labs to be done prior to visit.

## 2022-07-13 NOTE — ASSESSMENT & PLAN NOTE
Continue Pantoprazole 40 mg daily  Avoid spicy foods  Remain in an upright position for at least 30 minutes after consuming meals

## 2022-07-13 NOTE — ASSESSMENT & PLAN NOTE
Patient followed by Neurology  Continue Benztropine 0.5 mg bid, Carbidopa-Levodopa  mg QID, Zonisamide 100 mg daily.

## 2022-08-16 LAB — NONINV COLON CA DNA+OCC BLD SCRN STL QL: NEGATIVE

## 2022-09-02 DIAGNOSIS — G20.A1 PARKINSON DISEASE: Primary | ICD-10-CM

## 2022-09-02 RX ORDER — CARBIDOPA AND LEVODOPA 25; 100 MG/1; MG/1
TABLET ORAL
Qty: 180 TABLET | Refills: 4 | Status: SHIPPED | OUTPATIENT
Start: 2022-09-02 | End: 2022-09-02 | Stop reason: SDUPTHER

## 2022-09-02 RX ORDER — CARBIDOPA AND LEVODOPA 25; 100 MG/1; MG/1
TABLET ORAL
Qty: 180 TABLET | Refills: 4 | Status: SHIPPED | OUTPATIENT
Start: 2022-09-02 | End: 2022-09-26 | Stop reason: SDUPTHER

## 2022-09-26 DIAGNOSIS — G20.A1 PARKINSON DISEASE: ICD-10-CM

## 2022-09-26 RX ORDER — CARBIDOPA AND LEVODOPA 25; 100 MG/1; MG/1
TABLET ORAL
Qty: 540 TABLET | Refills: 1 | Status: SHIPPED | OUTPATIENT
Start: 2022-09-26 | End: 2022-10-05 | Stop reason: SDUPTHER
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
## 2022-10-05 ENCOUNTER — OFFICE VISIT (OUTPATIENT)
Dept: NEUROLOGY | Facility: CLINIC | Age: 57
End: 2022-10-05
Payer: MEDICARE

## 2022-10-05 VITALS
RESPIRATION RATE: 14 BRPM | TEMPERATURE: 98 F | DIASTOLIC BLOOD PRESSURE: 90 MMHG | HEIGHT: 71 IN | SYSTOLIC BLOOD PRESSURE: 135 MMHG | HEART RATE: 66 BPM | BODY MASS INDEX: 32.9 KG/M2 | OXYGEN SATURATION: 98 % | WEIGHT: 235 LBS

## 2022-10-05 DIAGNOSIS — G20.A1 PARKINSON DISEASE: ICD-10-CM

## 2022-10-05 DIAGNOSIS — R25.9 MIXED ACTION AND RESTING TREMOR: ICD-10-CM

## 2022-10-05 DIAGNOSIS — G20.A1 PARKINSON'S DISEASE: Primary | Chronic | ICD-10-CM

## 2022-10-05 PROCEDURE — 99214 PR OFFICE/OUTPT VISIT, EST, LEVL IV, 30-39 MIN: ICD-10-PCS | Mod: S$PBB,,, | Performed by: PSYCHIATRY & NEUROLOGY

## 2022-10-05 PROCEDURE — 99214 OFFICE O/P EST MOD 30 MIN: CPT | Mod: S$PBB,,, | Performed by: PSYCHIATRY & NEUROLOGY

## 2022-10-05 PROCEDURE — 99213 OFFICE O/P EST LOW 20 MIN: CPT | Mod: PBBFAC | Performed by: PSYCHIATRY & NEUROLOGY

## 2022-10-05 RX ORDER — BENZTROPINE MESYLATE 1 MG/1
1 TABLET ORAL 2 TIMES DAILY
Qty: 60 TABLET | Refills: 4 | Status: SHIPPED | OUTPATIENT
Start: 2022-10-05 | End: 2023-01-10 | Stop reason: SDUPTHER

## 2022-10-05 RX ORDER — CARBIDOPA AND LEVODOPA 25; 100 MG/1; MG/1
TABLET ORAL
Qty: 540 TABLET | Refills: 4 | Status: SHIPPED | OUTPATIENT
Start: 2022-10-05 | End: 2023-07-11 | Stop reason: SDUPTHER
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
## 2023-01-10 ENCOUNTER — OFFICE VISIT (OUTPATIENT)
Dept: NEUROLOGY | Facility: CLINIC | Age: 58
End: 2023-01-10
Payer: COMMERCIAL

## 2023-01-10 VITALS
DIASTOLIC BLOOD PRESSURE: 89 MMHG | HEART RATE: 78 BPM | RESPIRATION RATE: 12 BRPM | SYSTOLIC BLOOD PRESSURE: 133 MMHG | HEIGHT: 71 IN | OXYGEN SATURATION: 99 % | WEIGHT: 236.63 LBS | TEMPERATURE: 98 F | BODY MASS INDEX: 33.13 KG/M2

## 2023-01-10 DIAGNOSIS — G20.A1 PARKINSON'S DISEASE: Primary | ICD-10-CM

## 2023-01-10 DIAGNOSIS — R25.9 MIXED ACTION AND RESTING TREMOR: ICD-10-CM

## 2023-01-10 PROCEDURE — 99214 OFFICE O/P EST MOD 30 MIN: CPT | Mod: PBBFAC | Performed by: PSYCHIATRY & NEUROLOGY

## 2023-01-10 PROCEDURE — 3008F PR BODY MASS INDEX (BMI) DOCUMENTED: ICD-10-PCS | Mod: CPTII,,, | Performed by: PSYCHIATRY & NEUROLOGY

## 2023-01-10 PROCEDURE — 99214 PR OFFICE/OUTPT VISIT, EST, LEVL IV, 30-39 MIN: ICD-10-PCS | Mod: S$PBB,,, | Performed by: PSYCHIATRY & NEUROLOGY

## 2023-01-10 PROCEDURE — 3008F BODY MASS INDEX DOCD: CPT | Mod: CPTII,,, | Performed by: PSYCHIATRY & NEUROLOGY

## 2023-01-10 PROCEDURE — 3079F DIAST BP 80-89 MM HG: CPT | Mod: CPTII,,, | Performed by: PSYCHIATRY & NEUROLOGY

## 2023-01-10 PROCEDURE — 1159F PR MEDICATION LIST DOCUMENTED IN MEDICAL RECORD: ICD-10-PCS | Mod: CPTII,,, | Performed by: PSYCHIATRY & NEUROLOGY

## 2023-01-10 PROCEDURE — 99214 OFFICE O/P EST MOD 30 MIN: CPT | Mod: S$PBB,,, | Performed by: PSYCHIATRY & NEUROLOGY

## 2023-01-10 PROCEDURE — 1159F MED LIST DOCD IN RCRD: CPT | Mod: CPTII,,, | Performed by: PSYCHIATRY & NEUROLOGY

## 2023-01-10 PROCEDURE — 3075F SYST BP GE 130 - 139MM HG: CPT | Mod: CPTII,,, | Performed by: PSYCHIATRY & NEUROLOGY

## 2023-01-10 PROCEDURE — 3075F PR MOST RECENT SYSTOLIC BLOOD PRESS GE 130-139MM HG: ICD-10-PCS | Mod: CPTII,,, | Performed by: PSYCHIATRY & NEUROLOGY

## 2023-01-10 PROCEDURE — 3079F PR MOST RECENT DIASTOLIC BLOOD PRESSURE 80-89 MM HG: ICD-10-PCS | Mod: CPTII,,, | Performed by: PSYCHIATRY & NEUROLOGY

## 2023-01-10 RX ORDER — BENZTROPINE MESYLATE 1 MG/1
1 TABLET ORAL 2 TIMES DAILY
Qty: 180 TABLET | Refills: 1 | Status: SHIPPED | OUTPATIENT
Start: 2023-01-10 | End: 2023-07-11 | Stop reason: SDUPTHER

## 2023-01-13 ENCOUNTER — OFFICE VISIT (OUTPATIENT)
Dept: INTERNAL MEDICINE | Facility: CLINIC | Age: 58
End: 2023-01-13
Payer: COMMERCIAL

## 2023-01-13 VITALS
HEIGHT: 71 IN | HEART RATE: 76 BPM | RESPIRATION RATE: 18 BRPM | WEIGHT: 234.38 LBS | SYSTOLIC BLOOD PRESSURE: 138 MMHG | TEMPERATURE: 98 F | BODY MASS INDEX: 32.81 KG/M2 | DIASTOLIC BLOOD PRESSURE: 88 MMHG

## 2023-01-13 DIAGNOSIS — G89.29 CHRONIC NECK PAIN: Primary | ICD-10-CM

## 2023-01-13 DIAGNOSIS — F41.8 DEPRESSION WITH ANXIETY: Chronic | ICD-10-CM

## 2023-01-13 DIAGNOSIS — G20.A1 PARKINSON'S DISEASE: Chronic | ICD-10-CM

## 2023-01-13 DIAGNOSIS — I48.91 ATRIAL FIBRILLATION WITH RAPID VENTRICULAR RESPONSE: Chronic | ICD-10-CM

## 2023-01-13 DIAGNOSIS — K21.00 GASTROESOPHAGEAL REFLUX DISEASE WITH ESOPHAGITIS, UNSPECIFIED WHETHER HEMORRHAGE: Chronic | ICD-10-CM

## 2023-01-13 DIAGNOSIS — E66.09 CLASS 1 OBESITY DUE TO EXCESS CALORIES WITH SERIOUS COMORBIDITY AND BODY MASS INDEX (BMI) OF 33.0 TO 33.9 IN ADULT: Chronic | ICD-10-CM

## 2023-01-13 DIAGNOSIS — Z23 NEED FOR SHINGLES VACCINE: ICD-10-CM

## 2023-01-13 DIAGNOSIS — M54.2 CHRONIC NECK PAIN: Primary | ICD-10-CM

## 2023-01-13 PROCEDURE — 1159F PR MEDICATION LIST DOCUMENTED IN MEDICAL RECORD: ICD-10-PCS | Mod: CPTII,,, | Performed by: NURSE PRACTITIONER

## 2023-01-13 PROCEDURE — 99214 PR OFFICE/OUTPT VISIT, EST, LEVL IV, 30-39 MIN: ICD-10-PCS | Mod: 25,S$PBB,, | Performed by: NURSE PRACTITIONER

## 2023-01-13 PROCEDURE — 1160F RVW MEDS BY RX/DR IN RCRD: CPT | Mod: CPTII,,, | Performed by: NURSE PRACTITIONER

## 2023-01-13 PROCEDURE — 90471 IMMUNIZATION ADMIN: CPT | Mod: PBBFAC

## 2023-01-13 PROCEDURE — 3079F PR MOST RECENT DIASTOLIC BLOOD PRESSURE 80-89 MM HG: ICD-10-PCS | Mod: CPTII,,, | Performed by: NURSE PRACTITIONER

## 2023-01-13 PROCEDURE — 1160F PR REVIEW ALL MEDS BY PRESCRIBER/CLIN PHARMACIST DOCUMENTED: ICD-10-PCS | Mod: CPTII,,, | Performed by: NURSE PRACTITIONER

## 2023-01-13 PROCEDURE — 90750 HZV VACC RECOMBINANT IM: CPT | Mod: PBBFAC

## 2023-01-13 PROCEDURE — 3079F DIAST BP 80-89 MM HG: CPT | Mod: CPTII,,, | Performed by: NURSE PRACTITIONER

## 2023-01-13 PROCEDURE — 3075F PR MOST RECENT SYSTOLIC BLOOD PRESS GE 130-139MM HG: ICD-10-PCS | Mod: CPTII,,, | Performed by: NURSE PRACTITIONER

## 2023-01-13 PROCEDURE — 99214 OFFICE O/P EST MOD 30 MIN: CPT | Mod: 25,S$PBB,, | Performed by: NURSE PRACTITIONER

## 2023-01-13 PROCEDURE — 1159F MED LIST DOCD IN RCRD: CPT | Mod: CPTII,,, | Performed by: NURSE PRACTITIONER

## 2023-01-13 PROCEDURE — 99214 OFFICE O/P EST MOD 30 MIN: CPT | Mod: PBBFAC | Performed by: NURSE PRACTITIONER

## 2023-01-13 PROCEDURE — 3008F PR BODY MASS INDEX (BMI) DOCUMENTED: ICD-10-PCS | Mod: CPTII,,, | Performed by: NURSE PRACTITIONER

## 2023-01-13 PROCEDURE — 3075F SYST BP GE 130 - 139MM HG: CPT | Mod: CPTII,,, | Performed by: NURSE PRACTITIONER

## 2023-01-13 PROCEDURE — 3008F BODY MASS INDEX DOCD: CPT | Mod: CPTII,,, | Performed by: NURSE PRACTITIONER

## 2023-01-13 RX ORDER — METHOCARBAMOL 500 MG/1
500 TABLET, FILM COATED ORAL NIGHTLY PRN
Qty: 30 TABLET | Refills: 6 | Status: SHIPPED | OUTPATIENT
Start: 2023-01-13 | End: 2023-07-14 | Stop reason: SDUPTHER

## 2023-01-13 NOTE — PROGRESS NOTES
Patient ID: [id number]     Chief Complaint: Follow-up and Results    HPI:     Reza Wellington is a 57 y.o. male with diagnosis of A-Fib, Parkinson's Disease, Anxiety/Depression, Chronic Neck/Lumbar Pain, Obesity, Hx of Umbilical Hernia. Patient seen in clinic today for neck pain, requesting second shingles vaccine. Patient last seen in clinic on 07/13/2022.  Patient previously prescribed Robaxin for neck pain and worked well. Patient denies headaches, cervical pain, radiculopathy. Patient denies dysphagia. Patient does have diagnosis of GERD and is currently prescribed Pantoprazole 40 mg daily. Patient's wife present during appointment. Patient's wife request medication refills.      Patient followed by Neurology Clinic. Last appointment on  01/10/2023.  This is a 57 y.o. Right hand dominant male with history of anxiety, depression, who is referred for tremor disorder that appears to be left hand predominant mixed resting and postural tremor without significant bradykinesia or paratonia, that improves with Sinemet challenge, consistent with Parkinson's with overflow action tremor. Will start weaning off of Benztropine. Has a mild function component to it.  During last visit, zonisamide 100 mg once a day was stopped.  Benztropine was increased back to 1 mg twice a day. Stable overall. Tremor has improved. This is a 57 y.o. Right hand dominant male with history of anxiety, depression, who is referred for tremor disorder that appears to be left hand predominant mixed resting and postural tremor without significant bradykinesia or paratonia, that improves with Sinemet challenge, consistent with Parkinson's with overflow action tremor.  Failed benztropine weaning.  Has a mild function component to it.  Dx Parkinson's Disease, Mixed Action and Resting Tremor. Continue Sinemet 25mg-100mg, take 2 tabs in AM with 2 tabs a Noon and 1 tab in PM and 1 tab at Qhs; continue with Benztropine 1 mg bid.      Patient followed by  Cardiology Clinic. Last appointment on 06/09/2022. Past medical history of Anxiety, Atrial fibrillation, Chronic cervical pain, Chronic lumbar pain, Depression, Obesity, Parkinson's disease, and Umbilical hernia. He presents to clinic today for follow-up of chronic medical conditions. Patient said in May 2021, he had palpitations, and uncomfortableness that could not go way. Patient denies any chest pain burning pressure-like symptoms during these episodes. Patient went to the hospital was found to have atrial fibrillation was started on a Cardizem drip and was converted back into sinus rhythm. Patient has not had any palpitations since that episode. Patient denies any anginal chest pain, paroxysmal nocturnal dyspnea, orthopnea, limb claudication symptoms. Patient is able to complete his daily activities of life. Of note patient says that he feels dizzy sometimes when he ambulates from a sitting position. In addition patient says that his PCP is thinking that it is vertigo. Paroxymal atrial fibrillation: Chadsvasc is 0, Stop aspirin, ASCVD is 5.5 %, Patient is currently on dilt 120, Start crestor 10, Switch from dilt to toprol xl (patient having dizziness), Patient will follow with neurology for dizziness and gastroparesis. No follow up appointment noted. Will send message to Cardiology Clinic to clarify if follow up appointment needed.      Patient followed by Orthopedic Clinic. Last appointment on 04/19/2022. Presents to ortho clinic for initial visit for right hip pain. Patient points to lateral hip. Patient reports insidious ionset approx 6 months ago. Had no reported falls or trauma. Reports pain along his greater trochanter. Has had improvement with NSAIDS previously. Has not had any CSIs of his right hip. Dx: Right greater trochanteric pain syndrome with moderate exacerbation, no significnt degenrative changes to right hip joint; CRICKET AND FADIR negative; has tenderness to lateral aspect of greater trochater;  Tendiopathy of the gluteus minimus and mild bursitis present on ultrasound exam. Treatment plan: CSI greater trochanter on right; NSAIDS prn; PT x 12 weeks and activity modification; WIll consider MRI if no improvement at folllow up. Weight Management is paramount: recommend at least 10 pounds weight loss. Procedure: discussed CSI injections as treatment options; since conservative measures did not improve symptoms patient consented for CSI today. Activity as tolerated; HEP to included aerobic conditioning with non-painful activity and ROM/STG exercises; proper footwear; assistive device to avoid limping. Therapy: formal PT/OT ordered. Medication: OTC or prescription NSAIDs; medication precautions given. RTC: PRN; call if any issues.         FIT: Cologuard ordered 2022  PSA: 0.4  Medicare Wellness: ordered    Review of patient's allergies indicates:  No Known Allergies    Colorectal Cancer Screening: Cologuard negative on 2022  Prostate Cancer Screenin.36 on 2021  AAA Screening: N/A  Medicare Wellness: ordered  Immunizations:   Immunization History   Administered Date(s) Administered    COVID-19, MRNA, LN-S, PF (Pfizer) (Purple Cap) 2021, 2021, 2021    COVID-19, mRNA, LNP-S, bivalent booster, PF (PFIZER OMICRON) 2022    Influenza - Quadrivalent 2020    Influenza - Quadrivalent - PF *Preferred* (6 months and older) 2022    Tdap 2021    Zoster 2021    Zoster Recombinant 2023     Past Surgical History:   Procedure Laterality Date    HERNIA REPAIR      KNEE SURGERY       family history includes Kidney disease in his mother.    Social History     Socioeconomic History    Marital status: Single   Tobacco Use    Smoking status: Never    Smokeless tobacco: Never   Substance and Sexual Activity    Alcohol use: Not Currently     Alcohol/week: 1.0 standard drink     Types: 1 Cans of beer per week     Comment: monthly beer    Drug use: Never    Sexual  "activity: Not Currently     Current Outpatient Medications   Medication Instructions    benztropine (COGENTIN) 1 mg, Oral, 2 times daily    busPIRone (BUSPAR) 15 mg, Oral, 3 times daily    carbidopa-levodopa  mg (SINEMET)  mg per tablet TWO TABLETS EVERY MORNING, TWO TABLETS AT NOON, ONE TABLET EVERY EVENING &  ONE TABLET AT 9pm    meclizine (ANTIVERT) 12.5 mg, Oral, Daily PRN    methocarbamoL (ROBAXIN) 500 mg, Oral, Nightly PRN    metoprolol succinate (TOPROL-XL) 25 mg, Oral, Daily    oxymetazoline (VICKS SINEX ULTRA FINE MIST 12) 0.05 % Mist   2 spray(s), PRN PRN congestion, 0 Refill(s)    pantoprazole (PROTONIX) 40 mg, Oral, Daily    rosuvastatin (CRESTOR) 10 mg, Oral, Daily     Subjective:     Review of Systems   Constitutional: Negative.    HENT: Negative.     Eyes: Negative.    Respiratory: Negative.     Cardiovascular: Negative.    Gastrointestinal: Negative.    Endocrine: Negative.    Genitourinary: Negative.    Musculoskeletal:  Positive for neck pain.   Skin: Negative.    Allergic/Immunologic: Negative.    Neurological: Negative.    Hematological: Negative.    Psychiatric/Behavioral: Negative.       Objective:     Visit Vitals  /88 (BP Location: Left arm, Patient Position: Sitting, BP Method: Large (Automatic))   Pulse 76   Temp 97.7 °F (36.5 °C) (Oral)   Resp 18   Ht 5' 10.87" (1.8 m)   Wt 106.3 kg (234 lb 6.4 oz)   BMI 32.82 kg/m²     Physical Exam  Vitals reviewed.   Constitutional:       Appearance: Normal appearance.   HENT:      Head: Normocephalic and atraumatic.      Mouth/Throat:      Mouth: Mucous membranes are moist.      Pharynx: Oropharynx is clear.   Eyes:      Extraocular Movements: Extraocular movements intact.      Conjunctiva/sclera: Conjunctivae normal.      Pupils: Pupils are equal, round, and reactive to light.   Neck:     Cardiovascular:      Rate and Rhythm: Normal rate and regular rhythm.      Heart sounds: Normal heart sounds.   Pulmonary:      Effort: Pulmonary " effort is normal.      Breath sounds: Normal breath sounds.   Abdominal:      General: Bowel sounds are normal.   Musculoskeletal:         General: Normal range of motion.      Cervical back: Normal range of motion.   Skin:     General: Skin is warm and dry.   Neurological:      Mental Status: He is alert and oriented to person, place, and time.   Psychiatric:         Mood and Affect: Mood normal.         Behavior: Behavior normal.     Labs Reviewed:     Labs reviewed    Assessment:       ICD-10-CM ICD-9-CM   1. Chronic neck pain  M54.2 723.1    G89.29 338.29   2. Gastroesophageal reflux disease with esophagitis, unspecified whether hemorrhage  K21.00 530.11   3. Parkinson's disease  G20 332.0   4. Atrial fibrillation with rapid ventricular response  I48.91 427.31   5. Depression with anxiety  F41.8 300.4   6. Class 1 obesity due to excess calories with serious comorbidity and body mass index (BMI) of 33.0 to 33.9 in adult  E66.09 278.00    Z68.33 V85.33   7. Need for shingles vaccine [Z23 (ICD-10-CM)]  Z23 V04.89        Plan:     1. Chronic neck pain  Start Methocarbamol 500 mg Qhs    2. Gastroesophageal reflux disease with esophagitis, unspecified whether hemorrhage  Continue Pantoprazole 40 mg daily  Avoid spicy foods  Remain in an upright position for at least 30 minutes after consuming meals    3. Parkinson's disease  Followed by Neurology     4. Atrial fibrillation with rapid ventricular response  Followed by Cardiology    5. Depression with anxiety  Continue Buspirone    6. Class 1 obesity due to excess calories with serious comorbidity and body mass index (BMI) of 33.0 to 33.9 in adult  Body mass index is 32.82 kg/m².  Thyroid Stimulating Hormone   Date Value Ref Range Status   06/30/2021 0.6476 0.3500 - 4.9400 uIU/mL Final     Vit D 25 OH   Date Value Ref Range Status   01/13/2023 18.4 (L) 30.0 - 80.0 ng/mL Final     Hemoglobin A1c   Date Value Ref Range Status   06/30/2021 5.7 <<=7.0 % Final   Sleep Study:  none noted  Weight Loss Encouraged  Increase Physical Activity    7. Need for shingles vaccine [Z23 (ICD-10-CM)]  Shingles vaccine administered today    Follow up in about 6 months (around 7/13/2023) for Labs. In addition to their scheduled follow up, the patient has also been instructed to follow up on as needed basis.     Elsa Verduzco, BETOP

## 2023-01-31 ENCOUNTER — OFFICE VISIT (OUTPATIENT)
Dept: CARDIOLOGY | Facility: CLINIC | Age: 58
End: 2023-01-31
Payer: COMMERCIAL

## 2023-01-31 VITALS
BODY MASS INDEX: 32.95 KG/M2 | TEMPERATURE: 98 F | RESPIRATION RATE: 20 BRPM | HEART RATE: 67 BPM | SYSTOLIC BLOOD PRESSURE: 138 MMHG | OXYGEN SATURATION: 99 % | DIASTOLIC BLOOD PRESSURE: 80 MMHG | HEIGHT: 71 IN | WEIGHT: 235.38 LBS

## 2023-01-31 DIAGNOSIS — R42 DIZZINESS: Chronic | ICD-10-CM

## 2023-01-31 DIAGNOSIS — K21.00 GASTROESOPHAGEAL REFLUX DISEASE WITH ESOPHAGITIS, UNSPECIFIED WHETHER HEMORRHAGE: Chronic | ICD-10-CM

## 2023-01-31 DIAGNOSIS — M62.838 MUSCLE SPASM: ICD-10-CM

## 2023-01-31 DIAGNOSIS — E78.2 MIXED HYPERLIPIDEMIA: ICD-10-CM

## 2023-01-31 DIAGNOSIS — I48.91 ATRIAL FIBRILLATION WITH RAPID VENTRICULAR RESPONSE: Primary | Chronic | ICD-10-CM

## 2023-01-31 PROBLEM — E78.5 HYPERLIPEMIA: Status: ACTIVE | Noted: 2023-01-31

## 2023-01-31 PROCEDURE — 99214 OFFICE O/P EST MOD 30 MIN: CPT | Mod: PBBFAC | Performed by: INTERNAL MEDICINE

## 2023-01-31 NOTE — PROGRESS NOTES
"01/31/2023 10:50 AM    Subjective:     CHIEF COMPLAINT:   Chief Complaint   Patient presents with    f/u denies chest pain or sob                           HPI:    Mr. Reza Wellington is a 57 y.o. male.   The patient comes for a follow-up appointment.  The patient has history of "Afib"; however, last EKG shows NSR.  He was placed on aspirin and b-blocker in the past.  Is no longer on a b-blocker.      CP:  The patient has no chest discomfort.      SOB:  The patient denies shortness of breath No KELLY    EDEMA:  The patient has edema.  Has ankle edema and Has ascites.      ORTHOPNEA:  The patient denies orthopnea.  No PND.      SYNCOPE:  The patient denies near syncope.  No syncope.   Gets lightheaded.    PALPITATIONS:  The patient has no palpitations.    LEVEL OF EXERTION:  The patient exercises and does not have symptoms with this level of exertion.  The patient's level of exertion is fair.    The patient denies recent cardiac testing.     Past Medical History    Patient Active Problem List   Diagnosis    Umbilical hernia    Tremor    Parkinson's disease    Class 1 obesity due to excess calories with serious comorbidity and body mass index (BMI) of 33.0 to 33.9 in adult    Gastroesophageal reflux disease with esophagitis    Depression with anxiety    Chronic neck pain    Chronic low back pain    Atrial fibrillation with rapid ventricular response    Arthralgia of hip    Dizziness    Muscle spasm    Functional tremor    Hyperlipemia       Surgical History    Past Surgical History:   Procedure Laterality Date    HERNIA REPAIR      KNEE SURGERY         Social History    Social History     Socioeconomic History    Marital status: Single   Tobacco Use    Smoking status: Never    Smokeless tobacco: Never   Substance and Sexual Activity    Alcohol use: Not Currently     Alcohol/week: 1.0 standard drink     Types: 1 Cans of beer per week     Comment: monthly beer    Drug use: Never    Sexual activity: Not Currently " "      Family History    Family History   Problem Relation Age of Onset    Kidney disease Mother     Arthritis Mother     Diabetes Mother     Cancer Maternal Aunt     Depression Paternal Aunt     Hypertension Paternal Aunt        Allergy  Review of patient's allergies indicates:  No Known Allergies    Current Medications    Current Outpatient Medications:     benztropine (COGENTIN) 1 MG tablet, Take 1 tablet (1 mg total) by mouth 2 (two) times daily., Disp: 180 tablet, Rfl: 1    busPIRone (BUSPAR) 15 MG tablet, Take 15 mg by mouth 3 (three) times daily., Disp: , Rfl:     carbidopa-levodopa  mg (SINEMET)  mg per tablet, TWO TABLETS EVERY MORNING, TWO TABLETS AT NOON, ONE TABLET EVERY EVENING &  ONE TABLET AT 9pm, Disp: 540 tablet, Rfl: 4    meclizine (ANTIVERT) 12.5 mg tablet, Take 12.5 mg by mouth daily as needed for Dizziness., Disp: , Rfl:     methocarbamoL (ROBAXIN) 500 MG Tab, Take 1 tablet (500 mg total) by mouth nightly as needed (spasm)., Disp: 30 tablet, Rfl: 6    metoprolol succinate (TOPROL-XL) 25 MG 24 hr tablet, Take 1 tablet (25 mg total) by mouth once daily., Disp: 30 tablet, Rfl: 11    oxymetazoline (VICKS SINEX ULTRA FINE MIST 12) 0.05 % Mist,  2 spray(s), PRN PRN congestion, 0 Refill(s), Disp: , Rfl:     pantoprazole (PROTONIX) 40 MG tablet, Take 40 mg by mouth once daily., Disp: , Rfl:     rosuvastatin (CRESTOR) 10 MG tablet, Take 1 tablet (10 mg total) by mouth once daily., Disp: 90 tablet, Rfl: 3    ROS:   Please see HPI                                                                                                                                                                              Objective:     PE:  Blood pressure 138/80, pulse 67, temperature 97.7 °F (36.5 °C), temperature source Oral, resp. rate 20, height 5' 11" (1.803 m), weight 106.8 kg (235 lb 6.4 oz), SpO2 99 %.   BP Readings from Last 3 Encounters:   01/31/23 138/80   01/13/23 138/88   01/10/23 133/89       Wt " Readings from Last 3 Encounters:   01/31/23 106.8 kg (235 lb 6.4 oz)   01/13/23 106.3 kg (234 lb 6.4 oz)   01/10/23 107.3 kg (236 lb 9.6 oz)     BMI 32.83 kg/m^2    GENERAL:  Ambulates.  Has tremor.   CONSTITUTIONAL:  No acute distress.  Not ill appearing.  NECK:  No cervical adenopathy.  No carotid bruit.  CARDIOVASCULAR:  Normal rate.  Regular rhythm.  No murmur.  PULMONARY:  No respiratory distress.  No wheezing.  No crackles.  ABDOMINAL:  No distention.  No tenderness.  Obese.   MUSCULOSKELETAL:  No deformity.  No edema.  SKIN:  No bruising.  No rash.  NEUROLOGICAL:  Oriented x 3.  No weakness.                                                                                                                                                                                                                                                                                                                                                                                                                                                                               CARDIAC TESTING:  EKG  No results found for this or any previous visit.  Echocardiogram  No results found for this or any previous visit.    Stress Test  No results found for this or any previous visit.     Coronary Angiogram  No results found for this or any previous visit.     Last BMP  BMP  Lab Results   Component Value Date     01/13/2023    K 4.0 01/13/2023    CO2 29 01/13/2023    BUN 13.8 01/13/2023    CREATININE 1.02 01/13/2023    CALCIUM 8.7 01/13/2023    EGFRNORACEVR >60 01/13/2023     Last CBC     Lab Results   Component Value Date    WBC 5.2 01/13/2023    HGB 16.3 01/13/2023    HCT 48.7 01/13/2023    MCV 92.8 01/13/2023     01/13/2023         Last lipids    Lab Results   Component Value Date    CHOL 138 01/13/2023    CHOL 139 06/30/2021     Lab Results   Component Value Date    HDL 39 01/13/2023    HDL 31 (L) 06/30/2021     No results found for:  LDLCALC  Lab Results   Component Value Date    TRIG 84 01/13/2023    TRIG 118 06/30/2021     No results found for: CHOLHDL    Assessment:     1. Atrial fibrillation with rapid ventricular response    2. Gastroesophageal reflux disease with esophagitis, unspecified whether hemorrhage    3. Dizziness    4. Muscle spasm    5. Mixed hyperlipidemia    Body mass index is 32.83 kg/m².  Patient is obese (BMI 30-34.9)  Tobacco:  denied  Alcohol:  none  Substance abuse:  none   Last PCP visit:  1/13/2023      Plan:     Medications:      Diet:  Avoid processed foods and drinks, sugar, salt, fried/greasy foods, and fast foods      Diet:  Recommend 10 servings of fruits and vegetables per day    Exercise:  on regular basis    Follow up:  6 months    Randall Phillip MD      Future Appointments   Date Time Provider Department Center   7/11/2023  8:45 AM Francoise Barrera MD [facility]   7/14/2023  8:00 AM [facility]

## 2023-03-26 DIAGNOSIS — E55.9 VITAMIN D DEFICIENCY: Primary | ICD-10-CM

## 2023-04-25 ENCOUNTER — PATIENT MESSAGE (OUTPATIENT)
Dept: RESEARCH | Facility: HOSPITAL | Age: 58
End: 2023-04-25
Payer: COMMERCIAL

## 2023-05-02 ENCOUNTER — PATIENT MESSAGE (OUTPATIENT)
Dept: RESEARCH | Facility: HOSPITAL | Age: 58
End: 2023-05-02
Payer: COMMERCIAL
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
## 2023-07-11 ENCOUNTER — LAB VISIT (OUTPATIENT)
Dept: LAB | Facility: HOSPITAL | Age: 58
End: 2023-07-11
Attending: NURSE PRACTITIONER
Payer: COMMERCIAL

## 2023-07-11 ENCOUNTER — OFFICE VISIT (OUTPATIENT)
Dept: NEUROLOGY | Facility: CLINIC | Age: 58
End: 2023-07-11
Payer: COMMERCIAL

## 2023-07-11 VITALS
WEIGHT: 235.63 LBS | HEIGHT: 71 IN | BODY MASS INDEX: 32.99 KG/M2 | RESPIRATION RATE: 12 BRPM | HEART RATE: 63 BPM | SYSTOLIC BLOOD PRESSURE: 137 MMHG | DIASTOLIC BLOOD PRESSURE: 85 MMHG | OXYGEN SATURATION: 100 % | TEMPERATURE: 98 F

## 2023-07-11 DIAGNOSIS — G20.A1 PARKINSON'S DISEASE: Primary | ICD-10-CM

## 2023-07-11 DIAGNOSIS — E55.9 VITAMIN D DEFICIENCY: ICD-10-CM

## 2023-07-11 DIAGNOSIS — G20.A1 PARKINSON DISEASE: ICD-10-CM

## 2023-07-11 LAB
ALBUMIN SERPL-MCNC: 4 G/DL (ref 3.5–5)
ALBUMIN/GLOB SERPL: 1.1 RATIO (ref 1.1–2)
ALP SERPL-CCNC: 104 UNIT/L (ref 40–150)
ALT SERPL-CCNC: <5 UNIT/L (ref 0–55)
AST SERPL-CCNC: 16 UNIT/L (ref 5–34)
BASOPHILS # BLD AUTO: 0.04 X10(3)/MCL
BASOPHILS NFR BLD AUTO: 0.6 %
BILIRUBIN DIRECT+TOT PNL SERPL-MCNC: 0.6 MG/DL
BUN SERPL-MCNC: 18 MG/DL (ref 8.4–25.7)
CALCIUM SERPL-MCNC: 8.9 MG/DL (ref 8.4–10.2)
CHLORIDE SERPL-SCNC: 106 MMOL/L (ref 98–107)
CO2 SERPL-SCNC: 25 MMOL/L (ref 22–29)
CREAT SERPL-MCNC: 0.9 MG/DL (ref 0.73–1.18)
DEPRECATED CALCIDIOL+CALCIFEROL SERPL-MC: 18 NG/ML (ref 30–80)
EOSINOPHIL # BLD AUTO: 0.12 X10(3)/MCL (ref 0–0.9)
EOSINOPHIL NFR BLD AUTO: 1.8 %
ERYTHROCYTE [DISTWIDTH] IN BLOOD BY AUTOMATED COUNT: 12.3 % (ref 11.5–17)
GFR SERPLBLD CREATININE-BSD FMLA CKD-EPI: >60 MLS/MIN/1.73/M2
GLOBULIN SER-MCNC: 3.7 GM/DL (ref 2.4–3.5)
GLUCOSE SERPL-MCNC: 96 MG/DL (ref 74–100)
HCT VFR BLD AUTO: 47.2 % (ref 42–52)
HGB BLD-MCNC: 15.9 G/DL (ref 14–18)
IMM GRANULOCYTES # BLD AUTO: 0.01 X10(3)/MCL (ref 0–0.04)
IMM GRANULOCYTES NFR BLD AUTO: 0.1 %
LYMPHOCYTES # BLD AUTO: 2.15 X10(3)/MCL (ref 0.6–4.6)
LYMPHOCYTES NFR BLD AUTO: 31.8 %
MCH RBC QN AUTO: 30.8 PG (ref 27–31)
MCHC RBC AUTO-ENTMCNC: 33.7 G/DL (ref 33–36)
MCV RBC AUTO: 91.5 FL (ref 80–94)
MONOCYTES # BLD AUTO: 0.65 X10(3)/MCL (ref 0.1–1.3)
MONOCYTES NFR BLD AUTO: 9.6 %
NEUTROPHILS # BLD AUTO: 3.8 X10(3)/MCL (ref 2.1–9.2)
NEUTROPHILS NFR BLD AUTO: 56.1 %
NRBC BLD AUTO-RTO: 0 %
PLATELET # BLD AUTO: 174 X10(3)/MCL (ref 130–400)
PMV BLD AUTO: 11.7 FL (ref 7.4–10.4)
POTASSIUM SERPL-SCNC: 3.8 MMOL/L (ref 3.5–5.1)
PROT SERPL-MCNC: 7.7 GM/DL (ref 6.4–8.3)
RBC # BLD AUTO: 5.16 X10(6)/MCL (ref 4.7–6.1)
SODIUM SERPL-SCNC: 140 MMOL/L (ref 136–145)
WBC # SPEC AUTO: 6.77 X10(3)/MCL (ref 4.5–11.5)

## 2023-07-11 PROCEDURE — 36415 COLL VENOUS BLD VENIPUNCTURE: CPT

## 2023-07-11 PROCEDURE — 80053 COMPREHEN METABOLIC PANEL: CPT

## 2023-07-11 PROCEDURE — 3075F SYST BP GE 130 - 139MM HG: CPT | Mod: CPTII,,, | Performed by: PSYCHIATRY & NEUROLOGY

## 2023-07-11 PROCEDURE — 99214 PR OFFICE/OUTPT VISIT, EST, LEVL IV, 30-39 MIN: ICD-10-PCS | Mod: S$PBB,GC,, | Performed by: PSYCHIATRY & NEUROLOGY

## 2023-07-11 PROCEDURE — 82306 VITAMIN D 25 HYDROXY: CPT

## 2023-07-11 PROCEDURE — 3075F PR MOST RECENT SYSTOLIC BLOOD PRESS GE 130-139MM HG: ICD-10-PCS | Mod: CPTII,,, | Performed by: PSYCHIATRY & NEUROLOGY

## 2023-07-11 PROCEDURE — 1159F MED LIST DOCD IN RCRD: CPT | Mod: CPTII,,, | Performed by: PSYCHIATRY & NEUROLOGY

## 2023-07-11 PROCEDURE — 99214 OFFICE O/P EST MOD 30 MIN: CPT | Mod: PBBFAC | Performed by: PSYCHIATRY & NEUROLOGY

## 2023-07-11 PROCEDURE — 3079F PR MOST RECENT DIASTOLIC BLOOD PRESSURE 80-89 MM HG: ICD-10-PCS | Mod: CPTII,,, | Performed by: PSYCHIATRY & NEUROLOGY

## 2023-07-11 PROCEDURE — 1159F PR MEDICATION LIST DOCUMENTED IN MEDICAL RECORD: ICD-10-PCS | Mod: CPTII,,, | Performed by: PSYCHIATRY & NEUROLOGY

## 2023-07-11 PROCEDURE — 3079F DIAST BP 80-89 MM HG: CPT | Mod: CPTII,,, | Performed by: PSYCHIATRY & NEUROLOGY

## 2023-07-11 PROCEDURE — 1160F RVW MEDS BY RX/DR IN RCRD: CPT | Mod: CPTII,,, | Performed by: PSYCHIATRY & NEUROLOGY

## 2023-07-11 PROCEDURE — 3008F BODY MASS INDEX DOCD: CPT | Mod: CPTII,,, | Performed by: PSYCHIATRY & NEUROLOGY

## 2023-07-11 PROCEDURE — 1160F PR REVIEW ALL MEDS BY PRESCRIBER/CLIN PHARMACIST DOCUMENTED: ICD-10-PCS | Mod: CPTII,,, | Performed by: PSYCHIATRY & NEUROLOGY

## 2023-07-11 PROCEDURE — 85025 COMPLETE CBC W/AUTO DIFF WBC: CPT

## 2023-07-11 PROCEDURE — 3008F PR BODY MASS INDEX (BMI) DOCUMENTED: ICD-10-PCS | Mod: CPTII,,, | Performed by: PSYCHIATRY & NEUROLOGY

## 2023-07-11 PROCEDURE — 99214 OFFICE O/P EST MOD 30 MIN: CPT | Mod: S$PBB,GC,, | Performed by: PSYCHIATRY & NEUROLOGY

## 2023-07-11 RX ORDER — CARBIDOPA AND LEVODOPA 25; 100 MG/1; MG/1
TABLET ORAL
Qty: 540 TABLET | Refills: 4 | Status: SHIPPED | OUTPATIENT
Start: 2023-07-11 | End: 2024-01-26 | Stop reason: SDUPTHER

## 2023-07-11 RX ORDER — BENZTROPINE MESYLATE 1 MG/1
1 TABLET ORAL 2 TIMES DAILY
Qty: 180 TABLET | Refills: 1 | Status: SHIPPED | OUTPATIENT
Start: 2023-07-11 | End: 2024-01-26 | Stop reason: SDUPTHER
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
## 2023-07-14 ENCOUNTER — OFFICE VISIT (OUTPATIENT)
Dept: INTERNAL MEDICINE | Facility: CLINIC | Age: 58
End: 2023-07-14
Payer: COMMERCIAL

## 2023-07-14 VITALS
WEIGHT: 238 LBS | BODY MASS INDEX: 33.32 KG/M2 | HEIGHT: 71 IN | TEMPERATURE: 98 F | SYSTOLIC BLOOD PRESSURE: 123 MMHG | HEART RATE: 68 BPM | DIASTOLIC BLOOD PRESSURE: 86 MMHG | RESPIRATION RATE: 18 BRPM

## 2023-07-14 DIAGNOSIS — R10.84 GENERALIZED ABDOMINAL PAIN: Primary | ICD-10-CM

## 2023-07-14 DIAGNOSIS — I48.91 ATRIAL FIBRILLATION WITH RAPID VENTRICULAR RESPONSE: Chronic | ICD-10-CM

## 2023-07-14 DIAGNOSIS — Z00.00 WELLNESS EXAMINATION: ICD-10-CM

## 2023-07-14 DIAGNOSIS — E66.09 CLASS 1 OBESITY DUE TO EXCESS CALORIES WITH SERIOUS COMORBIDITY AND BODY MASS INDEX (BMI) OF 33.0 TO 33.9 IN ADULT: Chronic | ICD-10-CM

## 2023-07-14 DIAGNOSIS — F41.8 DEPRESSION WITH ANXIETY: Chronic | ICD-10-CM

## 2023-07-14 DIAGNOSIS — K21.00 GASTROESOPHAGEAL REFLUX DISEASE WITH ESOPHAGITIS, UNSPECIFIED WHETHER HEMORRHAGE: Chronic | ICD-10-CM

## 2023-07-14 DIAGNOSIS — G20.A1 PARKINSON'S DISEASE: Chronic | ICD-10-CM

## 2023-07-14 PROCEDURE — 99214 OFFICE O/P EST MOD 30 MIN: CPT | Mod: PBBFAC | Performed by: NURSE PRACTITIONER

## 2023-07-14 PROCEDURE — 1160F PR REVIEW ALL MEDS BY PRESCRIBER/CLIN PHARMACIST DOCUMENTED: ICD-10-PCS | Mod: CPTII,,, | Performed by: NURSE PRACTITIONER

## 2023-07-14 PROCEDURE — 1159F PR MEDICATION LIST DOCUMENTED IN MEDICAL RECORD: ICD-10-PCS | Mod: CPTII,,, | Performed by: NURSE PRACTITIONER

## 2023-07-14 PROCEDURE — 1160F RVW MEDS BY RX/DR IN RCRD: CPT | Mod: CPTII,,, | Performed by: NURSE PRACTITIONER

## 2023-07-14 PROCEDURE — 3074F PR MOST RECENT SYSTOLIC BLOOD PRESSURE < 130 MM HG: ICD-10-PCS | Mod: CPTII,,, | Performed by: NURSE PRACTITIONER

## 2023-07-14 PROCEDURE — 3008F PR BODY MASS INDEX (BMI) DOCUMENTED: ICD-10-PCS | Mod: CPTII,,, | Performed by: NURSE PRACTITIONER

## 2023-07-14 PROCEDURE — 3079F DIAST BP 80-89 MM HG: CPT | Mod: CPTII,,, | Performed by: NURSE PRACTITIONER

## 2023-07-14 PROCEDURE — 1159F MED LIST DOCD IN RCRD: CPT | Mod: CPTII,,, | Performed by: NURSE PRACTITIONER

## 2023-07-14 PROCEDURE — 99214 PR OFFICE/OUTPT VISIT, EST, LEVL IV, 30-39 MIN: ICD-10-PCS | Mod: S$PBB,,, | Performed by: NURSE PRACTITIONER

## 2023-07-14 PROCEDURE — 3074F SYST BP LT 130 MM HG: CPT | Mod: CPTII,,, | Performed by: NURSE PRACTITIONER

## 2023-07-14 PROCEDURE — 3008F BODY MASS INDEX DOCD: CPT | Mod: CPTII,,, | Performed by: NURSE PRACTITIONER

## 2023-07-14 PROCEDURE — 3079F PR MOST RECENT DIASTOLIC BLOOD PRESSURE 80-89 MM HG: ICD-10-PCS | Mod: CPTII,,, | Performed by: NURSE PRACTITIONER

## 2023-07-14 PROCEDURE — 99214 OFFICE O/P EST MOD 30 MIN: CPT | Mod: S$PBB,,, | Performed by: NURSE PRACTITIONER

## 2023-07-14 RX ORDER — ERGOCALCIFEROL 1.25 MG/1
50000 CAPSULE ORAL
Qty: 12 CAPSULE | Refills: 2 | Status: SHIPPED | OUTPATIENT
Start: 2023-07-14 | End: 2024-01-18 | Stop reason: SDUPTHER

## 2023-07-14 RX ORDER — METHOCARBAMOL 500 MG/1
500 TABLET, FILM COATED ORAL NIGHTLY PRN
Qty: 30 TABLET | Refills: 6 | Status: SHIPPED | OUTPATIENT
Start: 2023-07-14 | End: 2024-01-18 | Stop reason: SDUPTHER

## 2023-07-14 RX ORDER — PANTOPRAZOLE SODIUM 40 MG/1
40 TABLET, DELAYED RELEASE ORAL DAILY
Qty: 90 TABLET | Refills: 2 | Status: SHIPPED | OUTPATIENT
Start: 2023-07-14 | End: 2024-01-18 | Stop reason: SDUPTHER

## 2023-07-14 NOTE — PROGRESS NOTES
Patient ID: [id number]     Chief Complaint: Follow-up and Abdominal Pain (C/O abdominal discomfort  )    HPI:     Reza Wellington is a 57 y.o. male with diagnosis of A-Fib, Parkinson's Disease, Anxiety/Depression, Chronic Neck/Lumbar Pain, Obesity, Hx of Umbilical Hernia. Patient seen in clinic today for follow up. Patient last seen in clinic on 01/13/2023.  Patient prescribed Robaxin for neck pain, states works well. Patient denies headaches, cervical pain, radiculopathy.   Today, patient states abdominal discomfort. Patient's wife states she thinks it is from in increased ice cream he has been eating. Patient states abdominal discomfort does appear to happen after ingesting dairy products. Patient denies dysphagia, N/V/D/C, weight loss. Patient does have diagnosis of GERD and is currently prescribed Pantoprazole 40 mg daily.      Patient followed by Neurology Clinic. Last appointment on  07/11/2023.  This is a 57 y.o. Right hand dominant male with history of anxiety, depression, who is referred for tremor disorder that appears to be left hand predominant mixed resting and postural tremor without significant bradykinesia or paratonia, that improves with Sinemet challenge, consistent with Parkinson's with overflow action tremor. Failed benztropine weaning.  Has a mild function component to it. Dx: Parkinson's Disease. Doing well. Continue with Sinemet 25mg - 100mg @ 2 tab in AM, 2 tab in Noon, 1 tab in PM, 1 tab at 9PM; Benztropine 1 mg BID. Patient has follow up appointment scheduled for 01/11/2024.     Patient followed by Cardiology Clinic. Last appointment on 01/31/2023. Dx: Atrial fibrillation with rapid ventricular response. GERD. Dizziness. Muscle Spasm. Mixed Hyperlipidemia. Medications: none. Diet: Avoid processed foods and drinks, sugar, salt, fried/greasy foods, and fast foods, Recommend 10 servings of fruits and vegetables per day. Exercise: on regular basis. Patient has follow up appointment scheduled  for 2023.      Patient followed by Orthopedic Clinic. Last appointment on 2022. Presents to ortho clinic for initial visit for right hip pain. Patient points to lateral hip. Patient reports insidious ionset approx 6 months ago. Had no reported falls or trauma. Reports pain along his greater trochanter. Has had improvement with NSAIDS previously. Has not had any CSIs of his right hip. Dx: Right greater trochanteric pain syndrome with moderate exacerbation, no significnt degenrative changes to right hip joint; CRICKET AND FADIR negative; has tenderness to lateral aspect of greater trochater; Tendiopathy of the gluteus minimus and mild bursitis present on ultrasound exam. Treatment plan: CSI greater trochanter on right; NSAIDS prn; PT x 12 weeks and activity modification; WIll consider MRI if no improvement at folllow up. Weight Management is paramount: recommend at least 10 pounds weight loss. Procedure: discussed CSI injections as treatment options; since conservative measures did not improve symptoms patient consented for CSI today. Activity as tolerated; HEP to included aerobic conditioning with non-painful activity and ROM/STG exercises; proper footwear; assistive device to avoid limping. Therapy: formal PT/OT ordered. Medication: OTC or prescription NSAIDs; medication precautions given. RTC: PRN; call if any issues.     Review of patient's allergies indicates:  No Known Allergies    Breast Cancer Screening: N/A  Cervical Cancer Screening: N/A  Colorectal Cancer Screening: Cologuard negative on 2022  Diabetic Eye Exam: N/A  Diabetic Foot Exam: N/A  Lung Cancer Screening: N/A  Prostate Cancer Screenin.36 on 2021  AAA Screening: N/A  Osteoporosis Screening: N/A  Medicare Wellness: ordered  Immunizations:   Immunization History   Administered Date(s) Administered    COVID-19, MRNA, LN-S, PF (Pfizer) (Purple Cap) 2021, 2021, 2021    COVID-19, mRNA, LNP-S, bivalent booster,  PF (PFIZER OMICRON) 11/16/2022    Influenza - Quadrivalent 11/25/2020    Influenza - Quadrivalent - PF *Preferred* (6 months and older) 11/16/2022    Tdap 06/29/2021    Zoster 06/29/2021    Zoster Recombinant 01/13/2023     Past Surgical History:   Procedure Laterality Date    HERNIA REPAIR      KNEE SURGERY       family history includes Arthritis in his mother; Cancer in his maternal aunt; Depression in his paternal aunt; Diabetes in his mother; Hypertension in his paternal aunt; Kidney disease in his mother.    Social History     Socioeconomic History    Marital status: Single   Tobacco Use    Smoking status: Never    Smokeless tobacco: Never   Substance and Sexual Activity    Alcohol use: Not Currently     Alcohol/week: 1.0 standard drink     Types: 1 Cans of beer per week     Comment: monthly beer    Drug use: Never    Sexual activity: Not Currently     Current Outpatient Medications   Medication Instructions    benztropine (COGENTIN) 1 mg, Oral, 2 times daily    busPIRone (BUSPAR) 15 mg, Oral, 3 times daily    carbidopa-levodopa  mg (SINEMET)  mg per tablet TWO TABLETS EVERY MORNING, TWO TABLETS AT NOON, ONE TABLET EVERY EVENING &  ONE TABLET AT 9pm    ergocalciferol (ERGOCALCIFEROL) 50,000 Units, Oral, Every 7 days    methocarbamoL (ROBAXIN) 500 mg, Oral, Nightly PRN    metoprolol succinate (TOPROL-XL) 25 mg, Oral, Daily    oxymetazoline (VICKS SINEX ULTRA FINE MIST 12) 0.05 % Mist   2 spray(s), PRN PRN congestion, 0 Refill(s)    pantoprazole (PROTONIX) 40 mg, Oral, Daily    rosuvastatin (CRESTOR) 10 mg, Oral, Daily       Subjective:     Review of Systems   Constitutional: Negative.    HENT: Negative.     Eyes: Negative.    Respiratory: Negative.     Cardiovascular: Negative.    Gastrointestinal:  Positive for abdominal pain.   Endocrine: Negative.    Genitourinary: Negative.    Musculoskeletal: Negative.    Skin: Negative.    Allergic/Immunologic: Negative.    Neurological: Negative.   "  Hematological: Negative.    Psychiatric/Behavioral: Negative.       Objective:     Visit Vitals  /86 (BP Location: Left arm, Patient Position: Sitting, BP Method: Large (Automatic))   Pulse 68   Temp 97.5 °F (36.4 °C) (Oral)   Resp 18   Ht 5' 10.98" (1.803 m)   Wt 108 kg (238 lb)   BMI 33.21 kg/m²       Physical Exam  Vitals reviewed.   Constitutional:       Appearance: Normal appearance.   HENT:      Head: Normocephalic and atraumatic.      Mouth/Throat:      Mouth: Mucous membranes are moist.      Pharynx: Oropharynx is clear.   Eyes:      Extraocular Movements: Extraocular movements intact.      Conjunctiva/sclera: Conjunctivae normal.      Pupils: Pupils are equal, round, and reactive to light.   Cardiovascular:      Rate and Rhythm: Normal rate and regular rhythm.      Heart sounds: Normal heart sounds.   Pulmonary:      Effort: Pulmonary effort is normal.      Breath sounds: Normal breath sounds.   Abdominal:      General: Bowel sounds are normal.   Musculoskeletal:         General: Normal range of motion.      Cervical back: Normal range of motion.   Skin:     General: Skin is warm and dry.   Neurological:      Mental Status: He is alert and oriented to person, place, and time.   Psychiatric:         Mood and Affect: Mood normal.         Behavior: Behavior normal.       Labs Reviewed:     Hematology:  Lab Results   Component Value Date    WBC 6.77 07/11/2023    HGB 15.9 07/11/2023    HCT 47.2 07/11/2023     07/11/2023     Chemistry:  Lab Results   Component Value Date     07/11/2023    K 3.8 07/11/2023    CHLORIDE 106 07/11/2023    BUN 18.0 07/11/2023    CREATININE 0.90 07/11/2023    EGFRNORACEVR >60 07/11/2023    GLUCOSE 96 07/11/2023    CALCIUM 8.9 07/11/2023    ALKPHOS 104 07/11/2023    LABPROT 7.7 07/11/2023    ALBUMIN 4.0 07/11/2023    BILIDIR 0.3 06/30/2021    IBILI 0.40 06/30/2021    AST 16 07/11/2023    ALT <5 07/11/2023    IURBZOIO69GZ 18.0 (L) 07/11/2023     Lab Results "   Component Value Date    HGBA1C 5.7 06/30/2021     Lipid Panel:  Lab Results   Component Value Date    CHOL 138 01/13/2023    HDL 39 01/13/2023    LDL 82.00 01/13/2023    TRIG 84 01/13/2023    TOTALCHOLEST 4 01/13/2023     Thyroid:  Lab Results   Component Value Date    TSH 0.6476 06/30/2021     Urine:  Lab Results   Component Value Date    COLORUA Light-Yellow 01/13/2023    APPEARANCEUA Clear 01/13/2023    SGUA 1.015 01/13/2023    PHUA 7.0 01/13/2023    PROTEINUA Negative 01/13/2023    GLUCOSEUA 2+ (A) 01/13/2023    KETONESUA Negative 01/13/2023    BLOODUA Negative 01/13/2023    NITRITESUA Negative 01/13/2023    LEUKOCYTESUR Negative 01/13/2023    RBCUA 0-5 01/13/2023    WBCUA 0-5 01/13/2023    BACTERIA Trace (A) 01/13/2023    SQEPUA None Seen 01/13/2023    HYALINECASTS 0-2 (A) 01/13/2023    CREATRANDUR 129.3 01/13/2023        Assessment:       ICD-10-CM ICD-9-CM   1. Generalized abdominal pain  R10.84 789.07   2. Gastroesophageal reflux disease with esophagitis, unspecified whether hemorrhage  K21.00 530.11   3. Atrial fibrillation with rapid ventricular response  I48.91 427.31   4. Parkinson's disease  G20 332.0   5. Class 1 obesity due to excess calories with serious comorbidity and body mass index (BMI) of 33.0 to 33.9 in adult  E66.09 278.00    Z68.33 V85.33   6. Depression with anxiety  F41.8 300.4   7. Wellness examination  Z00.00 V70.0        Plan:     1. Generalized abdominal pain  Start consuming Lactose free products  If unable, purchase Lactaid tabs OTC    2. Gastroesophageal reflux disease with esophagitis, unspecified whether hemorrhage  Continue Protonix 40 mg daily  Avoid spicy foods  Remain in an upright position for at least 30 minutes after consuming meals    3. Atrial fibrillation with rapid ventricular response  Followed by Cardiology    4. Parkinson's disease  Followed by Neurology    5. Class 1 obesity due to excess calories with serious comorbidity and body mass index (BMI) of 33.0 to 33.9  in adult  Body mass index is 33.21 kg/m².  Thyroid Stimulating Hormone   Date Value Ref Range Status   06/30/2021 0.6476 0.3500 - 4.9400 uIU/mL Final     Vit D 25 OH   Date Value Ref Range Status   07/11/2023 18.0 (L) 30.0 - 80.0 ng/mL Final     Hemoglobin A1c   Date Value Ref Range Status   06/30/2021 5.7 <<=7.0 % Final   Sleep Study: none noted  Weight Loss Encouraged  Increase Physical Activity    6. Depression with anxiety  Followed by Mental Health Provider    - CBC Auto Differential; Future  - Comprehensive Metabolic Panel; Future  - Lipid Panel; Future  - Urinalysis; Future  - Microalbumin/Creatinine Ratio, Urine; Future  - TSH; Future  - Vitamin D; Future  - Urinalysis      Follow up in about 6 months (around 1/14/2024) for Labs. In addition to their scheduled follow up, the patient has also been instructed to follow up on as needed basis.     ALESSANDRO Crabtree

## 2023-07-21 NOTE — PROGRESS NOTES
CHIEF COMPLAINT:   Chief Complaint   Patient presents with    Follow-up     Dizziness with positioning with anxiety has flutters hx of atrial fib                                                  HPI:  Reza Wellington 57 y.o. male past medical history of AFib, Parkinson's, obesity, GERD and chronic neck and back pain who presents to cardiology clinic for follow up and ongoing care.  At last appointment patient appeared to be in NSR.  He denied any cardiac complaints at this time except for occasional dizziness.    Today the patient denies any chest pain, shortness a breath, palpitations, orthopnea, PND, bilateral leg edema, or syncope.  Patient does endorse daily dizziness.  He states the dizziness occurs with positional changes, sometimes at rest, and sometimes with activity.  He states that he is able to complete his ADLs without any ischemic symptoms or issues.  He states that he tries to be active at home and exercises every morning and tries to go for walks at [employer] a few times a week.  He reports compliance with all medications.  He denies any tobacco use.                                                                                                                                                                                                                                                                                                                                                                                                                                                                                      CARDIAC TESTING:  EKG 7.27.23  NSR. Vent rate 64 BPM.     Exercise Stress Test 9.8.21  The patient exercised according to the [facility] for 6:00 min:s, achieving a work level of Max. METS: 7.00.  The resting heart rate of 71 bpm ev to a maximal heart rate of 146 bpm. This value represents 89 % of the  maximal, age-predicted heart rate. The resting blood pressure of 137/112 mmHg , ev to a  maximum blood  pressure of 157/99 mmHg. The exercise test was stopped due to Target heart rate achieved.  Interpretation  Summary: Resting ECG: normal.  Chest Pain: none.    Patient Active Problem List   Diagnosis    Umbilical hernia    Tremor    Parkinson's disease    Class 1 obesity due to excess calories with serious comorbidity and body mass index (BMI) of 33.0 to 33.9 in adult    Gastroesophageal reflux disease with esophagitis    Depression with anxiety    Chronic neck pain    Chronic low back pain    Atrial fibrillation with rapid ventricular response    Arthralgia of hip    Dizziness    Muscle spasm    Functional tremor    Hyperlipemia     Past Surgical History:   Procedure Laterality Date    HERNIA REPAIR      KNEE SURGERY       Social History     Socioeconomic History    Marital status: Single   Tobacco Use    Smoking status: Never    Smokeless tobacco: Never   Substance and Sexual Activity    Alcohol use: Not Currently     Alcohol/week: 1.0 standard drink     Types: 1 Cans of beer per week     Comment: monthly beer    Drug use: Never    Sexual activity: Not Currently        Family History   Problem Relation Age of Onset    Kidney disease Mother     Arthritis Mother     Diabetes Mother     Cancer Maternal Aunt     Depression Paternal Aunt     Hypertension Paternal Aunt      Review of patient's allergies indicates:  No Known Allergies      ROS:  Review of Systems   Constitutional: Negative.    HENT: Negative.     Eyes: Negative.    Respiratory: Negative.  Negative for shortness of breath.    Cardiovascular: Negative.  Negative for chest pain, palpitations, orthopnea, claudication, leg swelling and PND.   Gastrointestinal: Negative.    Genitourinary: Negative.    Musculoskeletal: Negative.    Skin: Negative.    Neurological:  Positive for dizziness.   Endo/Heme/Allergies: Negative.    Psychiatric/Behavioral: Negative.                                                                                              "                                                                                   Negative except as stated in the history of present illness. See HPI for details.    PHYSICAL EXAM:  Visit Vitals  /87 (BP Location: Left arm, Patient Position: Sitting, BP Method: Medium (Automatic))   Pulse 72   Temp 98.4 °F (36.9 °C)   Resp 18   Ht 5' 11" (1.803 m)   Wt 105.4 kg (232 lb 5.8 oz)   SpO2 97%   BMI 32.41 kg/m²       Physical Exam  Constitutional:       Appearance: Normal appearance.   HENT:      Head: Normocephalic.      Nose: Nose normal.      Mouth/Throat:      Mouth: Mucous membranes are dry.   Eyes:      Pupils: Pupils are equal, round, and reactive to light.   Neck:      Vascular: No carotid bruit.   Cardiovascular:      Rate and Rhythm: Normal rate and regular rhythm.      Pulses: Normal pulses.      Heart sounds: Normal heart sounds. No murmur heard.  Pulmonary:      Effort: Pulmonary effort is normal.   Musculoskeletal:         General: Normal range of motion.      Right lower leg: No edema.      Left lower leg: No edema.   Skin:     General: Skin is warm and dry.   Neurological:      General: No focal deficit present.      Mental Status: He is alert and oriented to person, place, and time.   Psychiatric:         Mood and Affect: Mood normal.         Behavior: Behavior normal.       Current Outpatient Medications   Medication Instructions    benztropine (COGENTIN) 1 mg, Oral, 2 times daily    busPIRone (BUSPAR) 15 mg, Oral, 3 times daily    carbidopa-levodopa  mg (SINEMET)  mg per tablet TWO TABLETS EVERY MORNING, TWO TABLETS AT NOON, ONE TABLET EVERY EVENING &  ONE TABLET AT 9pm    ergocalciferol (ERGOCALCIFEROL) 50,000 Units, Oral, Every 7 days    methocarbamoL (ROBAXIN) 500 mg, Oral, Nightly PRN    metoprolol succinate (TOPROL-XL) 25 mg, Oral, Daily    oxymetazoline (VICKS SINEX ULTRA FINE MIST 12) 0.05 % Mist   2 spray(s), PRN PRN congestion, 0 Refill(s)    pantoprazole (PROTONIX) 40 mg, " Oral, Daily    rosuvastatin (CRESTOR) 10 mg, Oral, Daily        All medications, laboratory studies, cardiac diagnostic imaging reviewed.     Lab Results   Component Value Date    LDL 82.00 01/13/2023    LDL 84.00 06/30/2021    TRIG 84 01/13/2023    TRIG 118 06/30/2021    CREATININE 0.90 07/11/2023    K 3.8 07/11/2023        ASSESSMENT/PLAN:  Atrial fibrillation with rapid ventricular response  - Denies any palpitations, lightheadedness, or syncopal episodes   - Currently on metoprolol succinate 25 mg daily and tolerating well  - EKG reveals NSR and a vent rate of 64 BPM today     Dizziness  - Reports ongoing dizziness that occurs daily  - Will order Carotid US to rule out any underlying carotid disease that may be contributing to symptoms    Mixed hyperlipidemia  - LDL 82 (1.13.23)  - On Crestor 10 MG daily   - Counseled on low-cholesterol, heart healthy diet exercise as tolerated     GERD  - Management per PCP    Anxiety  - Management per PCP  - Patient states that he uses relaxation techniques to help him calm down, in addition to his medication    EKG today   Patient to complete carotid ultrasound prior to next office visit  Follow-up in cardiology clinic in 6 months or sooner if needed  Follow-up with PCP/Neurology as directed

## 2023-07-27 ENCOUNTER — OFFICE VISIT (OUTPATIENT)
Dept: CARDIOLOGY | Facility: CLINIC | Age: 58
End: 2023-07-27
Payer: COMMERCIAL

## 2023-07-27 VITALS
RESPIRATION RATE: 18 BRPM | HEART RATE: 72 BPM | DIASTOLIC BLOOD PRESSURE: 87 MMHG | SYSTOLIC BLOOD PRESSURE: 138 MMHG | TEMPERATURE: 98 F | OXYGEN SATURATION: 97 % | HEIGHT: 71 IN | WEIGHT: 232.38 LBS | BODY MASS INDEX: 32.53 KG/M2

## 2023-07-27 DIAGNOSIS — I48.91 ATRIAL FIBRILLATION WITH RAPID VENTRICULAR RESPONSE: Primary | ICD-10-CM

## 2023-07-27 DIAGNOSIS — R42 DIZZINESS: ICD-10-CM

## 2023-07-27 DIAGNOSIS — E78.2 MIXED HYPERLIPIDEMIA: ICD-10-CM

## 2023-07-27 PROCEDURE — 93005 ELECTROCARDIOGRAM TRACING: CPT

## 2023-07-27 PROCEDURE — 99215 OFFICE O/P EST HI 40 MIN: CPT | Mod: PBBFAC

## 2023-07-27 RX ORDER — METOPROLOL SUCCINATE 25 MG/1
25 TABLET, EXTENDED RELEASE ORAL DAILY
Qty: 90 TABLET | Refills: 3 | Status: SHIPPED | OUTPATIENT
Start: 2023-07-27 | End: 2024-07-26

## 2023-07-27 RX ORDER — ROSUVASTATIN CALCIUM 10 MG/1
10 TABLET, COATED ORAL DAILY
Qty: 90 TABLET | Refills: 3 | Status: SHIPPED | OUTPATIENT
Start: 2023-07-27 | End: 2024-07-26

## 2023-07-27 NOTE — PATIENT INSTRUCTIONS
EKG today   Patient to complete carotid ultrasound prior to next office visit  Follow-up in cardiology clinic in 6 months or sooner if needed  Follow-up with PCP/Neurology as directed

## 2023-07-28 ENCOUNTER — TELEPHONE (OUTPATIENT)
Dept: CARDIOLOGY | Facility: CLINIC | Age: 58
End: 2023-07-28
Payer: COMMERCIAL

## 2023-07-28 DIAGNOSIS — I48.91 ATRIAL FIBRILLATION WITH RAPID VENTRICULAR RESPONSE: Primary | ICD-10-CM

## 2023-08-16 DIAGNOSIS — R42 DIZZINESS: ICD-10-CM

## 2023-08-16 DIAGNOSIS — I48.0 PAROXYSMAL ATRIAL FIBRILLATION: Primary | ICD-10-CM

## 2023-09-08 ENCOUNTER — HOSPITAL ENCOUNTER (OUTPATIENT)
Dept: RADIOLOGY | Facility: HOSPITAL | Age: 58
Discharge: HOME | End: 2023-09-08
Payer: COMMERCIAL

## 2023-09-08 DIAGNOSIS — I48.0 PAROXYSMAL ATRIAL FIBRILLATION: ICD-10-CM

## 2023-09-08 DIAGNOSIS — R42 DIZZINESS: ICD-10-CM

## 2023-09-08 PROCEDURE — 93880 EXTRACRANIAL BILAT STUDY: CPT

## 2023-09-13 LAB
LEFT CCA DIST DIAS: 18 CM/S
LEFT CCA DIST SYS: 64 CM/S
LEFT CCA PROX DIAS: 18 CM/S
LEFT CCA PROX SYS: 77 CM/S
LEFT ECA DIAS: 6 CM/S
LEFT ECA SYS: 75 CM/S
LEFT ICA DIST DIAS: 26 CM/S
LEFT ICA DIST SYS: 68 CM/S
LEFT ICA MID DIAS: 25 CM/S
LEFT ICA MID SYS: 57 CM/S
LEFT ICA PROX DIAS: 16 CM/S
LEFT ICA PROX SYS: 48 CM/S
LEFT VERTEBRAL DIAS: 12 CM/S
LEFT VERTEBRAL SYS: 42 CM/S
OHS CV CAROTID RIGHT ICA EDV HIGHEST: 23
OHS CV CAROTID ULTRASOUND LEFT ICA/CCA RATIO: 1.06
OHS CV CAROTID ULTRASOUND RIGHT ICA/CCA RATIO: 0.77
OHS CV PV CAROTID LEFT HIGHEST CCA: 77
OHS CV PV CAROTID LEFT HIGHEST ICA: 68
OHS CV PV CAROTID RIGHT HIGHEST CCA: 83
OHS CV PV CAROTID RIGHT HIGHEST ICA: 64
OHS CV US CAROTID LEFT HIGHEST EDV: 26
RIGHT CCA DIST DIAS: 22 CM/S
RIGHT CCA DIST SYS: 83 CM/S
RIGHT CCA PROX DIAS: 16 CM/S
RIGHT CCA PROX SYS: 78 CM/S
RIGHT ECA DIAS: 8 CM/S
RIGHT ECA SYS: 70 CM/S
RIGHT ICA DIST DIAS: 20 CM/S
RIGHT ICA DIST SYS: 53 CM/S
RIGHT ICA MID DIAS: 23 CM/S
RIGHT ICA MID SYS: 64 CM/S
RIGHT ICA PROX DIAS: 13 CM/S
RIGHT ICA PROX SYS: 56 CM/S
RIGHT VERTEBRAL DIAS: 8 CM/S
RIGHT VERTEBRAL SYS: 25 CM/S

## 2024-01-16 NOTE — PROGRESS NOTES
CHIEF COMPLAINT:   Chief Complaint   Patient presents with    Follow-up     6 mos f//u denies cardiac targets                                                  HPI:  Reza Wellington 58 y.o. male past medical history of AFib, Parkinson's, obesity, GERD and chronic neck and back pain who presents to cardiology clinic for follow up and ongoing care.  At last office visit patient endorsed daily dizziness that he stated occurred with positional changes, sometimes at rest, and sometimes with activity.  He was also exhibiting some orthostatic symptoms.  Carotid ultrasound was ordered.  Carotid ultrasound revealed no significant stenosis in bilateral internal carotid arteries.  See full report below.    Today the patient states that he is feeling well overall and stable from last office visit.  He denies any chest pain, shortness of breath, palpitations, orthopnea, PND, bilateral leg edema, or syncope.  He continues to endorse some degree of dizziness and lightheadedness, especially with quick positional changes.  He specifically states that he notices these symptoms when going from lying or sitting to standing.  We discussed ways on how to reduce orthostatic symptoms.  He states that he is able to complete his ADLs without any ischemic symptoms or issues.  He states that he tries to be active at home and exercises every morning.  In the past, he was not able to do basic activities like grocery shop, however now he is able to make several laps around [employer] without any issues or ischemic symptoms.  He reports compliance with all medications.  He denies any tobacco use.                                                                                                                                                                                                                                                                                                                                                                                                                                                                                       CARDIAC TESTING:    CV US BL Doppler Carotid 9.8.23  The right internal carotid artery demonstrated no hemodynamically significant stenosis.  There is no substantial right side carotid plaque identified.  The right vertebral artery is patent with antegrade flow.  The left internal carotid artery demonstrated no hemodynamically significant stenosis.  There is no substantial left side carotid plaque identified.  The left vertebral artery is patent with antegrade flow.      EKG 7.27.23  NSR. Vent rate 64 BPM.     Exercise Stress Test 9.8.21  The patient exercised according to the [facility] for 6:00 min:s, achieving a work level of Max. METS: 7.00.  The resting heart rate of 71 bpm ev to a maximal heart rate of 146 bpm. This value represents 89 % of the  maximal, age-predicted heart rate. The resting blood pressure of 137/112 mmHg , ev to a maximum blood  pressure of 157/99 mmHg. The exercise test was stopped due to Target heart rate achieved.  Interpretation  Summary: Resting ECG: normal.  Chest Pain: none.    Patient Active Problem List   Diagnosis    Umbilical hernia    Tremor    Parkinson's disease    Class 1 obesity due to excess calories with serious comorbidity and body mass index (BMI) of 33.0 to 33.9 in adult    Gastroesophageal reflux disease with esophagitis    Depression with anxiety    Chronic neck pain    Chronic low back pain    Atrial fibrillation with rapid ventricular response    Arthralgia of hip    Dizziness    Muscle spasm    Functional tremor    Hyperlipemia     Past Surgical History:   Procedure Laterality Date    HERNIA REPAIR      KNEE SURGERY       Social History     Socioeconomic History    Marital status: Single   Tobacco Use    Smoking status: Never    Smokeless tobacco: Never   Substance and Sexual Activity    Alcohol use: Not Currently     Alcohol/week: 1.0 standard drink of  alcohol     Types: 1 Cans of beer per week     Comment: monthly beer    Drug use: Never    Sexual activity: Not Currently     Social Determinants of Health     Financial Resource Strain: High Risk (1/17/2024)    Overall Financial Resource Strain (CARDIA)     Difficulty of Paying Living Expenses: Hard   Food Insecurity: Food Insecurity Present (1/17/2024)    Hunger Vital Sign     Worried About Running Out of Food in the Last Year: Sometimes true     Ran Out of Food in the Last Year: Sometimes true   Transportation Needs: Unmet Transportation Needs (1/17/2024)    PRAPARE - Transportation     Lack of Transportation (Medical): No     Lack of Transportation (Non-Medical): Yes   Physical Activity: Unknown (1/17/2024)    Exercise Vital Sign     Days of Exercise per Week: 1 day   Stress: Stress Concern Present (1/17/2024)    [employer] [place] [employer] - Occupational Stress Questionnaire     Feeling of Stress : To some extent   Social Connections: Unknown (1/17/2024)    Social Connection and Isolation Panel [NHANES]     Frequency of Communication with Friends and Family: More than three times a week     Frequency of Social Gatherings with Friends and Family: Never     Active Member of Clubs or Organizations: Yes     Attends Club or Organization Meetings: 1 to 4 times per year     Marital Status: Never    Housing Stability: High Risk (1/17/2024)    Housing Stability Vital Sign     Unable to Pay for Housing in the Last Year: Yes     Unstable Housing in the Last Year: No        Family History   Problem Relation Age of Onset    Kidney disease Mother     Arthritis Mother     Diabetes Mother     Cancer Maternal Aunt     Depression Paternal Aunt     Hypertension Paternal Aunt     Learning disabilities Daughter      Review of patient's allergies indicates:  No Known Allergies      ROS:  Review of Systems   Constitutional: Negative.    HENT: Negative.     Eyes: Negative.    Respiratory: Negative.  Negative for  "shortness of breath.    Cardiovascular: Negative.  Negative for chest pain, palpitations, orthopnea, claudication, leg swelling and PND.   Gastrointestinal: Negative.    Genitourinary: Negative.    Musculoskeletal: Negative.    Skin: Negative.    Neurological:  Positive for dizziness.   Endo/Heme/Allergies: Negative.    Psychiatric/Behavioral: Negative.                                                                                                                                                                                  Negative except as stated in the history of present illness. See HPI for details.    PHYSICAL EXAM:  Visit Vitals  /79 (BP Location: Right arm, Patient Position: Sitting, BP Method: Medium (Automatic))   Pulse 62   Temp 98.1 °F (36.7 °C)   Resp 18   Ht 5' 11" (1.803 m)   Wt 105.2 kg (231 lb 14.8 oz)   SpO2 100%   BMI 32.35 kg/m²         Physical Exam  Constitutional:       Appearance: Normal appearance.   HENT:      Head: Normocephalic.      Nose: Nose normal.      Mouth/Throat:      Mouth: Mucous membranes are moist.   Eyes:      Pupils: Pupils are equal, round, and reactive to light.   Neck:      Vascular: No carotid bruit.   Cardiovascular:      Rate and Rhythm: Normal rate and regular rhythm.      Pulses: Normal pulses.      Heart sounds: Normal heart sounds. No murmur heard.  Pulmonary:      Effort: Pulmonary effort is normal.   Musculoskeletal:         General: Normal range of motion.      Right lower leg: No edema.      Left lower leg: No edema.   Skin:     General: Skin is warm and dry.   Neurological:      General: No focal deficit present.      Mental Status: He is alert and oriented to person, place, and time.   Psychiatric:         Mood and Affect: Mood normal.         Behavior: Behavior normal.         Current Outpatient Medications   Medication Instructions    benztropine (COGENTIN) 1 mg, Oral, 2 times daily    busPIRone (BUSPAR) 15 mg, Oral, 3 times daily    " carbidopa-levodopa  mg (SINEMET)  mg per tablet TWO TABLETS EVERY MORNING, TWO TABLETS AT NOON, ONE TABLET EVERY EVENING &  ONE TABLET AT 9pm    ergocalciferol (ERGOCALCIFEROL) 50,000 Units, Oral, Every 7 days    FLUoxetine 20 MG capsule 1 capsule, Oral, Daily    methocarbamoL (ROBAXIN) 500 mg, Oral, Nightly PRN    metoprolol succinate (TOPROL-XL) 25 mg, Oral, Daily    oxymetazoline (VICKS SINEX ULTRA FINE MIST 12) 0.05 % Mist   2 spray(s), PRN PRN congestion, 0 Refill(s)    pantoprazole (PROTONIX) 40 mg, Oral, Daily    rosuvastatin (CRESTOR) 10 mg, Oral, Daily        All medications, laboratory studies, cardiac diagnostic imaging reviewed.     Lab Results   Component Value Date    LDL 92.00 01/18/2024    LDL 82.00 01/13/2023    TRIG 85 01/18/2024    TRIG 84 01/13/2023    CREATININE 1.07 01/18/2024    K 3.9 01/18/2024        ASSESSMENT/PLAN:  Atrial fibrillation with rapid ventricular response  - Stable and asymptomatic- denies any palpitations, lightheadedness, or syncopal episodes   - Currently on metoprolol succinate 25 mg daily and tolerating well  - [id number] score 0 indicating a 0.2% stroke risk per year    Dizziness  - Reports ongoing dizziness and lightheadedness that typically occurs with quick changes in positions.  States that the symptoms usually occur when going from lying down or sitting to standing very quickly.  - Carotid ultrasound completed in September 2023 did not reveal any hemodynamically stenosis of bilateral internal carotid arteries.  Not suggestive NORBERT  - Discussed the importance of changing positions slowly, maintaining adequate hydration with water, and potentially compression socks for further vascular support in lower extremities    Mixed hyperlipidemia  - LDL 92 (1.18.24)  - On Crestor 10 MG daily   - Counseled on low-cholesterol, heart healthy diet exercise as tolerated     GERD  - Management per PCP    Anxiety  - Management per PCP  - Patient states that he uses  relaxation techniques to help him calm down, in addition to his medication      Follow up in cardiology clinic in 6 months or sooner if needed   Follow up with PCP as directed   Please call cardiac clinic for any change in symptoms

## 2024-01-18 ENCOUNTER — OFFICE VISIT (OUTPATIENT)
Dept: INTERNAL MEDICINE | Facility: CLINIC | Age: 59
End: 2024-01-18
Payer: COMMERCIAL

## 2024-01-18 ENCOUNTER — LAB VISIT (OUTPATIENT)
Dept: LAB | Facility: HOSPITAL | Age: 59
End: 2024-01-18
Attending: NURSE PRACTITIONER
Payer: COMMERCIAL

## 2024-01-18 VITALS
HEART RATE: 81 BPM | BODY MASS INDEX: 32.85 KG/M2 | TEMPERATURE: 99 F | DIASTOLIC BLOOD PRESSURE: 85 MMHG | HEIGHT: 71 IN | WEIGHT: 234.63 LBS | RESPIRATION RATE: 18 BRPM | SYSTOLIC BLOOD PRESSURE: 123 MMHG

## 2024-01-18 DIAGNOSIS — Z23 NEEDS FLU SHOT: ICD-10-CM

## 2024-01-18 DIAGNOSIS — I48.91 ATRIAL FIBRILLATION WITH RAPID VENTRICULAR RESPONSE: Chronic | ICD-10-CM

## 2024-01-18 DIAGNOSIS — G20.A1 PARKINSON'S DISEASE, UNSPECIFIED WHETHER DYSKINESIA PRESENT, UNSPECIFIED WHETHER MANIFESTATIONS FLUCTUATE: Chronic | ICD-10-CM

## 2024-01-18 DIAGNOSIS — Z00.00 WELLNESS EXAMINATION: ICD-10-CM

## 2024-01-18 DIAGNOSIS — E78.2 MIXED HYPERLIPIDEMIA: ICD-10-CM

## 2024-01-18 DIAGNOSIS — K21.00 GASTROESOPHAGEAL REFLUX DISEASE WITH ESOPHAGITIS, UNSPECIFIED WHETHER HEMORRHAGE: Chronic | ICD-10-CM

## 2024-01-18 DIAGNOSIS — E66.09 CLASS 1 OBESITY DUE TO EXCESS CALORIES WITH SERIOUS COMORBIDITY AND BODY MASS INDEX (BMI) OF 33.0 TO 33.9 IN ADULT: Chronic | ICD-10-CM

## 2024-01-18 DIAGNOSIS — H02.9 EYELID LESION: Primary | ICD-10-CM

## 2024-01-18 PROBLEM — E78.5 HYPERLIPEMIA: Chronic | Status: ACTIVE | Noted: 2023-01-31

## 2024-01-18 LAB
CREAT UR-MCNC: 200.5 MG/DL (ref 63–166)
MICROALBUMIN UR-MCNC: 7.9 UG/ML
MICROALBUMIN/CREAT RATIO PNL UR: 3.9 MG/GM CR (ref 0–30)

## 2024-01-18 PROCEDURE — 1159F MED LIST DOCD IN RCRD: CPT | Mod: CPTII,,, | Performed by: NURSE PRACTITIONER

## 2024-01-18 PROCEDURE — 1160F RVW MEDS BY RX/DR IN RCRD: CPT | Mod: CPTII,,, | Performed by: NURSE PRACTITIONER

## 2024-01-18 PROCEDURE — 82043 UR ALBUMIN QUANTITATIVE: CPT

## 2024-01-18 PROCEDURE — 99214 OFFICE O/P EST MOD 30 MIN: CPT | Mod: 25,S$PBB,, | Performed by: NURSE PRACTITIONER

## 2024-01-18 PROCEDURE — 90686 IIV4 VACC NO PRSV 0.5 ML IM: CPT | Mod: PBBFAC

## 2024-01-18 PROCEDURE — 3079F DIAST BP 80-89 MM HG: CPT | Mod: CPTII,,, | Performed by: NURSE PRACTITIONER

## 2024-01-18 PROCEDURE — 3074F SYST BP LT 130 MM HG: CPT | Mod: CPTII,,, | Performed by: NURSE PRACTITIONER

## 2024-01-18 PROCEDURE — 3008F BODY MASS INDEX DOCD: CPT | Mod: CPTII,,, | Performed by: NURSE PRACTITIONER

## 2024-01-18 PROCEDURE — G0008 ADMIN INFLUENZA VIRUS VAC: HCPCS | Mod: PBBFAC

## 2024-01-18 PROCEDURE — 99215 OFFICE O/P EST HI 40 MIN: CPT | Mod: PBBFAC,25 | Performed by: NURSE PRACTITIONER

## 2024-01-18 RX ORDER — ERGOCALCIFEROL 1.25 MG/1
50000 CAPSULE ORAL
Qty: 12 CAPSULE | Refills: 2 | Status: SHIPPED | OUTPATIENT
Start: 2024-01-18

## 2024-01-18 RX ORDER — METHOCARBAMOL 500 MG/1
500 TABLET, FILM COATED ORAL NIGHTLY PRN
Qty: 30 TABLET | Refills: 6 | Status: SHIPPED | OUTPATIENT
Start: 2024-01-18

## 2024-01-18 RX ORDER — PANTOPRAZOLE SODIUM 40 MG/1
40 TABLET, DELAYED RELEASE ORAL DAILY
Qty: 90 TABLET | Refills: 2 | Status: SHIPPED | OUTPATIENT
Start: 2024-01-18

## 2024-01-18 RX ORDER — FLUOXETINE HYDROCHLORIDE 20 MG/1
1 CAPSULE ORAL DAILY
COMMUNITY

## 2024-01-18 RX ADMIN — INFLUENZA VIRUS VACCINE 0.5 ML: 15; 15; 15; 15 SUSPENSION INTRAMUSCULAR at 10:01

## 2024-01-18 NOTE — PROGRESS NOTES
Patient ID: [id number]     Chief Complaint: Follow-up and Results (Has nodule near right eye, itches)    HPI:     Reza Wellington is a 58 y.o. male with diagnosis of A-Fib, Parkinson's Disease, Anxiety/Depression, Chronic Neck/Lumbar Pain, Obesity, Hx of Umbilical Hernia. Patient seen in clinic today for follow up. Patient last seen in clinic on 07/14/2023.  At previous appt, patient started on Lactose free products and Lactaid tablets for abdominal pain that appeared to be related to ingesting lactose products. Patient states abdominal pain resolved. Patient denies dysphagia, N/V/D/C, weight loss. Patient does have diagnosis of GERD and is currently prescribed Pantoprazole 40 mg daily.   Patient prescribed Robaxin for neck pain, states works well. Patient denies headaches, cervical pain, radiculopathy.   Today, patient states nodule to right eye that itches. Wife states nodule there for many years but has increased in size over the past couple months. Patient denies change in vision, drainage, eye pain.   Patient denies any other acute complaints.     Patient followed by Cardiology Clinic. Last appointment on 07/27/2023. Atrial fibrillation with rapid ventricular response: Denies any palpitations, lightheadedness, or syncopal episodes. Currently on metoprolol succinate 25 mg daily and tolerating well. EKG reveals NSR and a vent rate of 64 BPM today. Dizziness: Reports ongoing dizziness that occurs daily. Will order Carotid US to rule out any underlying carotid disease that may be contributing to symptoms. Mixed hyperlipidemia: LDL 82 (1.13.23). On Crestor 10 MG daily. Counseled on low-cholesterol, heart healthy diet exercise as tolerated. Patient has follow up appointment scheduled for 01/29/2024.      Patient followed by Neurology Clinic. Last appointment on  07/11/2023.  This is a 57 y.o. Right hand dominant male with history of anxiety, depression, who is referred for tremor disorder that appears to be left hand  predominant mixed resting and postural tremor without significant bradykinesia or paratonia, that improves with Sinemet challenge, consistent with Parkinson's with overflow action tremor. Failed benztropine weaning.  Has a mild function component to it. Dx: Parkinson's Disease. Doing well. Continue with Sinemet 25mg - 100mg @ 2 tab in AM, 2 tab in Noon, 1 tab in PM, 1 tab at 9PM; Benztropine 1 mg BID. Patient has follow up appointment scheduled for 01/11/2024.      Patient followed by Orthopedic Clinic. Last appointment on 04/19/2022. Presents to ortho clinic for initial visit for right hip pain. Patient points to lateral hip. Patient reports insidious ionset approx 6 months ago. Had no reported falls or trauma. Reports pain along his greater trochanter. Has had improvement with NSAIDS previously. Has not had any CSIs of his right hip. Dx: Right greater trochanteric pain syndrome with moderate exacerbation, no significnt degenrative changes to right hip joint; CRICKET AND FADIR negative; has tenderness to lateral aspect of greater trochater; Tendiopathy of the gluteus minimus and mild bursitis present on ultrasound exam. Treatment plan: CSI greater trochanter on right; NSAIDS prn; PT x 12 weeks and activity modification; WIll consider MRI if no improvement at folllow up. Weight Management is paramount: recommend at least 10 pounds weight loss. Procedure: discussed CSI injections as treatment options; since conservative measures did not improve symptoms patient consented for CSI today. Activity as tolerated; HEP to included aerobic conditioning with non-painful activity and ROM/STG exercises; proper footwear; assistive device to avoid limping. Therapy: formal PT/OT ordered. Medication: OTC or prescription NSAIDs; medication precautions given. RTC: PRN; call if any issues.     Review of patient's allergies indicates:  No Known Allergies    Breast Cancer Screening: N/A  Cervical Cancer Screening: N/A  Colorectal Cancer  Screening: Cologuard negative on 2022  Diabetic Eye Exam: N/A  Diabetic Foot Exam: N/A  Lung Cancer Screening: N/A  Prostate Cancer Screenin.36 on 2021  AAA Screening: N/A  Osteoporosis Screening: N/A  Medicare Wellness: ordered  Immunizations:   Immunization History   Administered Date(s) Administered    COVID-19, MRNA, LN-S, PF (Pfizer) (Purple Cap) 2021, 2021, 2021    COVID-19, mRNA, LNP-S, bivalent booster, PF (PFIZER OMICRON) 2022    Influenza - Quadrivalent 2020    Influenza - Quadrivalent - PF *Preferred* (6 months and older) 2022, 2024    Tdap 2021    Zoster 2021    Zoster Recombinant 2023     Past Surgical History:   Procedure Laterality Date    HERNIA REPAIR      KNEE SURGERY       family history includes Arthritis in his mother; Cancer in his maternal aunt; Depression in his paternal aunt; Diabetes in his mother; Hypertension in his paternal aunt; Kidney disease in his mother.    Social History     Socioeconomic History    Marital status: Single   Tobacco Use    Smoking status: Never    Smokeless tobacco: Never   Substance and Sexual Activity    Alcohol use: Not Currently     Alcohol/week: 1.0 standard drink of alcohol     Types: 1 Cans of beer per week     Comment: monthly beer    Drug use: Never    Sexual activity: Not Currently     Social Determinants of Health     Financial Resource Strain: High Risk (2024)    Overall Financial Resource Strain (CARDIA)     Difficulty of Paying Living Expenses: Hard   Food Insecurity: Food Insecurity Present (2024)    Hunger Vital Sign     Worried About Running Out of Food in the Last Year: Sometimes true     Ran Out of Food in the Last Year: Sometimes true   Transportation Needs: Unmet Transportation Needs (2024)    PRAPARE - Transportation     Lack of Transportation (Medical): No     Lack of Transportation (Non-Medical): Yes   Physical Activity: Unknown (2024)    Exercise  Vital Sign     Days of Exercise per Week: 1 day   Stress: Stress Concern Present (1/17/2024)    [employer] [place] [employer] - Occupational Stress Questionnaire     Feeling of Stress : To some extent   Social Connections: Unknown (1/17/2024)    Social Connection and Isolation Panel [NHANES]     Frequency of Communication with Friends and Family: More than three times a week     Frequency of Social Gatherings with Friends and Family: Never     Active Member of Clubs or Organizations: Yes     Attends Club or Organization Meetings: 1 to 4 times per year     Marital Status: Never    Housing Stability: High Risk (1/17/2024)    Housing Stability Vital Sign     Unable to Pay for Housing in the Last Year: Yes     Unstable Housing in the Last Year: No     Current Outpatient Medications   Medication Instructions    benztropine (COGENTIN) 1 mg, Oral, 2 times daily    busPIRone (BUSPAR) 15 mg, Oral, 3 times daily    carbidopa-levodopa  mg (SINEMET)  mg per tablet TWO TABLETS EVERY MORNING, TWO TABLETS AT NOON, ONE TABLET EVERY EVENING &  ONE TABLET AT 9pm    ergocalciferol (ERGOCALCIFEROL) 50,000 Units, Oral, Every 7 days    FLUoxetine 20 MG capsule 1 capsule, Oral, Daily    methocarbamoL (ROBAXIN) 500 mg, Oral, Nightly PRN    metoprolol succinate (TOPROL-XL) 25 mg, Oral, Daily    oxymetazoline (VICKS SINEX ULTRA FINE MIST 12) 0.05 % Mist   2 spray(s), PRN PRN congestion, 0 Refill(s)    pantoprazole (PROTONIX) 40 mg, Oral, Daily    rosuvastatin (CRESTOR) 10 mg, Oral, Daily       Subjective:     Review of Systems   Constitutional: Negative.    HENT: Negative.     Eyes: Negative.    Respiratory: Negative.     Cardiovascular: Negative.    Gastrointestinal:  Positive for abdominal pain.   Endocrine: Negative.    Genitourinary: Negative.    Musculoskeletal: Negative.    Skin: Negative.    Allergic/Immunologic: Negative.    Neurological: Negative.    Hematological: Negative.    Psychiatric/Behavioral:  "Negative.         Objective:     Visit Vitals  /85 (BP Location: Right arm, Patient Position: Sitting, BP Method: Large (Automatic))   Pulse 81   Temp 98.5 °F (36.9 °C) (Oral)   Resp 18   Ht 5' 10.98" (1.803 m)   Wt 106.4 kg (234 lb 9.6 oz)   BMI 32.73 kg/m²       Physical Exam  Vitals reviewed.   Constitutional:       Appearance: Normal appearance.   HENT:      Head: Normocephalic and atraumatic.      Mouth/Throat:      Mouth: Mucous membranes are moist.      Pharynx: Oropharynx is clear.   Eyes:      Extraocular Movements: Extraocular movements intact.      Conjunctiva/sclera: Conjunctivae normal.      Pupils: Pupils are equal, round, and reactive to light.   Cardiovascular:      Rate and Rhythm: Normal rate and regular rhythm.      Heart sounds: Normal heart sounds.   Pulmonary:      Effort: Pulmonary effort is normal.      Breath sounds: Normal breath sounds.   Abdominal:      General: Bowel sounds are normal.   Musculoskeletal:         General: Normal range of motion.      Cervical back: Normal range of motion.   Skin:     General: Skin is warm and dry.   Neurological:      Mental Status: He is alert and oriented to person, place, and time.   Psychiatric:         Mood and Affect: Mood normal.         Behavior: Behavior normal.       Labs Reviewed:     Hematology:  Lab Results   Component Value Date    WBC 4.92 01/18/2024    HGB 15.8 01/18/2024    HCT 46.6 01/18/2024     01/18/2024     Chemistry:  Lab Results   Component Value Date     01/18/2024    K 3.9 01/18/2024    CHLORIDE 106 01/18/2024    BUN 19.4 01/18/2024    CREATININE 1.07 01/18/2024    EGFRNORACEVR >60 01/18/2024    GLUCOSE 106 01/18/2024    CALCIUM 8.8 01/18/2024    ALKPHOS 93 01/18/2024    LABPROT 7.7 (H) 01/18/2024    ALBUMIN 3.9 01/18/2024    BILIDIR 0.3 06/30/2021    IBILI 0.40 06/30/2021    AST 13 01/18/2024    ALT <5 01/18/2024    [id number] 33.9 01/18/2024     Lab Results   Component Value Date    HGBA1C 5.7 06/30/2021 "     Lipid Panel:  Lab Results   Component Value Date    CHOL 147 01/18/2024    HDL 38 01/18/2024    LDL 92.00 01/18/2024    TRIG 85 01/18/2024    TOTALCHOLEST 4 01/18/2024     Thyroid:  Lab Results   Component Value Date    TSH 0.948 01/18/2024     Urine:  Lab Results   Component Value Date    COLORUA Light-Yellow 01/13/2023    APPEARANCEUA Clear 01/13/2023    SGUA 1.015 01/13/2023    PHUA 7.0 01/13/2023    PROTEINUA Negative 01/13/2023    GLUCOSEUA 2+ (A) 01/13/2023    KETONESUA Negative 01/13/2023    BLOODUA Negative 01/13/2023    NITRITESUA Negative 01/13/2023    LEUKOCYTESUR Negative 01/13/2023    RBCUA 0-5 01/13/2023    WBCUA 0-5 01/13/2023    BACTERIA Trace (A) 01/13/2023    SQEPUA None Seen 01/13/2023    HYALINECASTS 0-2 (A) 01/13/2023    CREATRANDUR 200.5 (H) 01/18/2024        Assessment:       ICD-10-CM ICD-9-CM   1. Eyelid lesion  H02.9 374.9   2. Atrial fibrillation with rapid ventricular response  I48.91 427.31   3. Mixed hyperlipidemia  E78.2 272.2   4. Gastroesophageal reflux disease with esophagitis, unspecified whether hemorrhage  K21.00 530.11   5. Parkinson's disease, unspecified whether dyskinesia present, unspecified whether manifestations fluctuate  G20.A1 332.0   6. Class 1 obesity due to excess calories with serious comorbidity and body mass index (BMI) of 33.0 to 33.9 in adult  E66.09 278.00    Z68.33 V85.33   7. Needs flu shot  Z23 V04.81       Plan:     1. Generalized abdominal pain  Start consuming Lactose free products  If unable, purchase Lactaid tabs OTC    2. Gastroesophageal reflux disease with esophagitis, unspecified whether hemorrhage  Continue Protonix 40 mg daily  Avoid spicy foods  Remain in an upright position for at least 30 minutes after consuming meals    3. Atrial fibrillation with rapid ventricular response  Followed by Cardiology    4. Parkinson's disease  Followed by Neurology    5. Class 1 obesity due to excess calories with serious comorbidity and body mass index (BMI)  of 33.0 to 33.9 in adult  Body mass index is 32.73 kg/m².  TSH   Date Value Ref Range Status   01/18/2024 0.948 0.350 - 4.940 uIU/mL Final     Vit D 25 OH   Date Value Ref Range Status   01/18/2024 33.9 30.0 - 80.0 ng/mL Final     Hemoglobin A1c   Date Value Ref Range Status   06/30/2021 5.7 <<=7.0 % Final   Sleep Study: none noted  Weight Loss Encouraged  Increase Physical Activity    6. Depression with anxiety  Followed by Mental Health Provider    - CBC Auto Differential; Future  - Comprehensive Metabolic Panel; Future  - Lipid Panel; Future  - Urinalysis; Future  - Microalbumin/Creatinine Ratio, Urine; Future  - TSH; Future  - Vitamin D; Future  - Urinalysis      Follow up in about 6 months (around 7/18/2024) for Labs, Virtual Visit. In addition to their scheduled follow up, the patient has also been instructed to follow up on as needed basis.     ALESSANDRO Crabtree
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
## 2024-01-26 ENCOUNTER — OFFICE VISIT (OUTPATIENT)
Dept: NEUROLOGY | Facility: CLINIC | Age: 59
End: 2024-01-26
Payer: COMMERCIAL

## 2024-01-26 VITALS
SYSTOLIC BLOOD PRESSURE: 133 MMHG | TEMPERATURE: 98 F | HEART RATE: 86 BPM | BODY MASS INDEX: 32.87 KG/M2 | WEIGHT: 234.81 LBS | HEIGHT: 71 IN | DIASTOLIC BLOOD PRESSURE: 85 MMHG | RESPIRATION RATE: 14 BRPM | OXYGEN SATURATION: 100 %

## 2024-01-26 DIAGNOSIS — G20.A2 PARKINSON'S DISEASE WITHOUT DYSKINESIA, WITH FLUCTUATING MANIFESTATIONS: Primary | ICD-10-CM

## 2024-01-26 PROCEDURE — 3075F SYST BP GE 130 - 139MM HG: CPT | Mod: CPTII,,, | Performed by: PSYCHIATRY & NEUROLOGY

## 2024-01-26 PROCEDURE — 3079F DIAST BP 80-89 MM HG: CPT | Mod: CPTII,,, | Performed by: PSYCHIATRY & NEUROLOGY

## 2024-01-26 PROCEDURE — 99214 OFFICE O/P EST MOD 30 MIN: CPT | Mod: S$PBB,GC,, | Performed by: PSYCHIATRY & NEUROLOGY

## 2024-01-26 PROCEDURE — 3008F BODY MASS INDEX DOCD: CPT | Mod: CPTII,,, | Performed by: PSYCHIATRY & NEUROLOGY

## 2024-01-26 PROCEDURE — G2211 COMPLEX E/M VISIT ADD ON: HCPCS | Mod: S$PBB,,, | Performed by: PSYCHIATRY & NEUROLOGY

## 2024-01-26 PROCEDURE — 99214 OFFICE O/P EST MOD 30 MIN: CPT | Mod: PBBFAC | Performed by: PSYCHIATRY & NEUROLOGY

## 2024-01-26 PROCEDURE — 1159F MED LIST DOCD IN RCRD: CPT | Mod: CPTII,,, | Performed by: PSYCHIATRY & NEUROLOGY

## 2024-01-26 RX ORDER — CARBIDOPA AND LEVODOPA 25; 100 MG/1; MG/1
TABLET ORAL
Qty: 540 TABLET | Refills: 4 | Status: SHIPPED | OUTPATIENT
Start: 2024-01-26 | End: 2024-05-31 | Stop reason: SDUPTHER

## 2024-01-26 RX ORDER — BENZTROPINE MESYLATE 1 MG/1
1 TABLET ORAL 2 TIMES DAILY
Qty: 180 TABLET | Refills: 1 | Status: SHIPPED | OUTPATIENT
Start: 2024-01-26 | End: 2024-05-31 | Stop reason: SDUPTHER
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
## 2024-01-29 ENCOUNTER — OFFICE VISIT (OUTPATIENT)
Dept: CARDIOLOGY | Facility: CLINIC | Age: 59
End: 2024-01-29
Payer: COMMERCIAL

## 2024-01-29 VITALS
TEMPERATURE: 98 F | HEIGHT: 71 IN | SYSTOLIC BLOOD PRESSURE: 125 MMHG | DIASTOLIC BLOOD PRESSURE: 79 MMHG | WEIGHT: 231.94 LBS | BODY MASS INDEX: 32.47 KG/M2 | OXYGEN SATURATION: 100 % | HEART RATE: 62 BPM | RESPIRATION RATE: 18 BRPM

## 2024-01-29 DIAGNOSIS — I48.91 ATRIAL FIBRILLATION WITH RAPID VENTRICULAR RESPONSE: Chronic | ICD-10-CM

## 2024-01-29 DIAGNOSIS — E78.2 MIXED HYPERLIPIDEMIA: Primary | Chronic | ICD-10-CM

## 2024-01-29 PROCEDURE — 3074F SYST BP LT 130 MM HG: CPT | Mod: CPTII,,,

## 2024-01-29 PROCEDURE — 1159F MED LIST DOCD IN RCRD: CPT | Mod: CPTII,,,

## 2024-01-29 PROCEDURE — 1160F RVW MEDS BY RX/DR IN RCRD: CPT | Mod: CPTII,,,

## 2024-01-29 PROCEDURE — 99215 OFFICE O/P EST HI 40 MIN: CPT | Mod: PBBFAC

## 2024-01-29 PROCEDURE — 3008F BODY MASS INDEX DOCD: CPT | Mod: CPTII,,,

## 2024-01-29 PROCEDURE — 3078F DIAST BP <80 MM HG: CPT | Mod: CPTII,,,

## 2024-01-29 PROCEDURE — 99214 OFFICE O/P EST MOD 30 MIN: CPT | Mod: S$PBB,,,

## 2024-01-29 NOTE — PATIENT INSTRUCTIONS
Follow up in cardiology clinic in 6 months or sooner if needed   Follow up with PCP as directed   Please call cardiac clinic for any change in symptoms

## 2024-04-15 ENCOUNTER — OFFICE VISIT (OUTPATIENT)
Dept: OPHTHALMOLOGY | Facility: CLINIC | Age: 59
End: 2024-04-15
Payer: COMMERCIAL

## 2024-04-15 VITALS — HEIGHT: 71 IN | WEIGHT: 231.5 LBS | BODY MASS INDEX: 32.41 KG/M2

## 2024-04-15 DIAGNOSIS — H02.823: ICD-10-CM

## 2024-04-15 DIAGNOSIS — H31.002 CHORIORETINAL SCAR, LEFT: ICD-10-CM

## 2024-04-15 DIAGNOSIS — G20.A1 PARKINSON'S DISEASE, UNSPECIFIED WHETHER DYSKINESIA PRESENT, UNSPECIFIED WHETHER MANIFESTATIONS FLUCTUATE: Primary | Chronic | ICD-10-CM

## 2024-04-15 PROCEDURE — 99213 OFFICE O/P EST LOW 20 MIN: CPT | Mod: PBBFAC,PN,25 | Performed by: STUDENT IN AN ORGANIZED HEALTH CARE EDUCATION/TRAINING PROGRAM

## 2024-04-15 PROCEDURE — 92250 FUNDUS PHOTOGRAPHY W/I&R: CPT | Mod: PBBFAC,PN | Performed by: OPHTHALMOLOGY

## 2024-04-15 RX ORDER — PHENYLEPH/TROPICAMIDE IN WATER 2.5 %-1 %
1 DROPS OPHTHALMIC (EYE) ONCE
Status: COMPLETED | OUTPATIENT
Start: 2024-04-15 | End: 2024-04-15

## 2024-04-15 RX ADMIN — Medication 1 DROP: at 07:04

## 2024-04-15 NOTE — PROGRESS NOTES
HPI    Eyelid lesion right lower lid- new eval  Last edited by Christine Khan RN on 4/15/2024  7:38 AM.            Assessment /Plan     For exam results, see Encounter Report.    Parkinson's disease, unspecified whether dyskinesia present, unspecified whether manifestations fluctuate    Epidermal cyst of eyelid, right  -     Ambulatory referral/consult to Ophthalmology  -     tropicamide /PHENYLephrine opthalmic solution 1 drop                 Optos 4/15/24:  OD: media clear, nerve p/s, macula flat, periphery flat w/o h/t/d  OS: media clear, winters ring, nerve p/s, macula flat, periphery flat w/o h/t/d, large CRS IT with atrophic central hole    Parkinson Disease  - No significant GIN, no complaints of diplopia    2. Right lateral canthal lesion  - Reports being there for years as of 2024. Benign in appearance. CTM. Can return sooner if increasing in size or becoming irritation  - Photo 4/15/24    3. Left CRS  - IT left eye. As of 2024, reports that 10+ years ago he had mild trauma to that eye and was told he had scar tissue  - Optos 4/15/24    RTC 1 year DFE, sooner as needed
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
## 2024-05-31 ENCOUNTER — OFFICE VISIT (OUTPATIENT)
Dept: NEUROLOGY | Facility: CLINIC | Age: 59
End: 2024-05-31
Payer: COMMERCIAL

## 2024-05-31 VITALS
WEIGHT: 231.5 LBS | HEIGHT: 71 IN | BODY MASS INDEX: 32.41 KG/M2 | HEART RATE: 57 BPM | TEMPERATURE: 97 F | DIASTOLIC BLOOD PRESSURE: 87 MMHG | SYSTOLIC BLOOD PRESSURE: 132 MMHG | RESPIRATION RATE: 12 BRPM | OXYGEN SATURATION: 100 %

## 2024-05-31 DIAGNOSIS — G20.A1 PARKINSON'S DISEASE WITHOUT DYSKINESIA OR FLUCTUATING MANIFESTATIONS: Primary | ICD-10-CM

## 2024-05-31 PROCEDURE — 99214 OFFICE O/P EST MOD 30 MIN: CPT | Mod: PBBFAC | Performed by: PSYCHIATRY & NEUROLOGY

## 2024-05-31 PROCEDURE — 99214 OFFICE O/P EST MOD 30 MIN: CPT | Mod: S$PBB,,, | Performed by: PSYCHIATRY & NEUROLOGY

## 2024-05-31 PROCEDURE — 3075F SYST BP GE 130 - 139MM HG: CPT | Mod: CPTII,,, | Performed by: PSYCHIATRY & NEUROLOGY

## 2024-05-31 PROCEDURE — 3008F BODY MASS INDEX DOCD: CPT | Mod: CPTII,,, | Performed by: PSYCHIATRY & NEUROLOGY

## 2024-05-31 PROCEDURE — G2211 COMPLEX E/M VISIT ADD ON: HCPCS | Mod: S$PBB,,, | Performed by: PSYCHIATRY & NEUROLOGY

## 2024-05-31 PROCEDURE — 1159F MED LIST DOCD IN RCRD: CPT | Mod: CPTII,,, | Performed by: PSYCHIATRY & NEUROLOGY

## 2024-05-31 PROCEDURE — 3079F DIAST BP 80-89 MM HG: CPT | Mod: CPTII,,, | Performed by: PSYCHIATRY & NEUROLOGY

## 2024-05-31 RX ORDER — CARBIDOPA AND LEVODOPA 25; 100 MG/1; MG/1
TABLET ORAL
Qty: 540 TABLET | Refills: 4 | Status: SHIPPED | OUTPATIENT
Start: 2024-05-31 | End: 2024-06-07 | Stop reason: SDUPTHER

## 2024-05-31 RX ORDER — BENZTROPINE MESYLATE 1 MG/1
1 TABLET ORAL 2 TIMES DAILY
Qty: 180 TABLET | Refills: 1 | Status: SHIPPED | OUTPATIENT
Start: 2024-05-31 | End: 2024-06-07 | Stop reason: SDUPTHER

## 2024-06-07 DIAGNOSIS — G20.A1 PARKINSON'S DISEASE WITHOUT DYSKINESIA OR FLUCTUATING MANIFESTATIONS: ICD-10-CM

## 2024-06-07 RX ORDER — CARBIDOPA AND LEVODOPA 25; 100 MG/1; MG/1
TABLET ORAL
Qty: 540 TABLET | Refills: 4 | Status: SHIPPED | OUTPATIENT
Start: 2024-06-07

## 2024-06-07 RX ORDER — BENZTROPINE MESYLATE 1 MG/1
1 TABLET ORAL 2 TIMES DAILY
Qty: 180 TABLET | Refills: 1 | Status: SHIPPED | OUTPATIENT
Start: 2024-06-07 | End: 2024-12-04

## 2024-06-07 NOTE — TELEPHONE ENCOUNTER
Please prescription to local pharmacy and [facility] is not longer a provider through patient's insurance. Spoke to Ms Wellington and she is in agreement to have prescriptions sent locally until she can obtain information for mail service pharmacy provider with insurance.        Thank you

## 2024-07-23 DIAGNOSIS — G20.A1 PARKINSON'S DISEASE WITHOUT DYSKINESIA OR FLUCTUATING MANIFESTATIONS: ICD-10-CM

## 2024-07-25 RX ORDER — CARBIDOPA AND LEVODOPA 25; 100 MG/1; MG/1
TABLET ORAL
Qty: 540 TABLET | Refills: 4 | Status: SHIPPED | OUTPATIENT
Start: 2024-07-25

## 2024-07-29 ENCOUNTER — OFFICE VISIT (OUTPATIENT)
Dept: CARDIOLOGY | Facility: CLINIC | Age: 59
End: 2024-07-29
Payer: COMMERCIAL

## 2024-07-29 VITALS
HEART RATE: 68 BPM | DIASTOLIC BLOOD PRESSURE: 89 MMHG | SYSTOLIC BLOOD PRESSURE: 126 MMHG | TEMPERATURE: 98 F | BODY MASS INDEX: 31.42 KG/M2 | RESPIRATION RATE: 18 BRPM | HEIGHT: 71 IN | OXYGEN SATURATION: 100 % | WEIGHT: 224.44 LBS

## 2024-07-29 DIAGNOSIS — E78.2 MIXED HYPERLIPIDEMIA: Primary | Chronic | ICD-10-CM

## 2024-07-29 DIAGNOSIS — R42 DIZZINESS: Chronic | ICD-10-CM

## 2024-07-29 DIAGNOSIS — I48.91 ATRIAL FIBRILLATION WITH RAPID VENTRICULAR RESPONSE: ICD-10-CM

## 2024-07-29 LAB
OHS QRS DURATION: 84 MS
OHS QTC CALCULATION: 445 MS

## 2024-07-29 PROCEDURE — 1159F MED LIST DOCD IN RCRD: CPT | Mod: CPTII,,,

## 2024-07-29 PROCEDURE — 1160F RVW MEDS BY RX/DR IN RCRD: CPT | Mod: CPTII,,,

## 2024-07-29 PROCEDURE — 99215 OFFICE O/P EST HI 40 MIN: CPT | Mod: PBBFAC,25

## 2024-07-29 PROCEDURE — 3079F DIAST BP 80-89 MM HG: CPT | Mod: CPTII,,,

## 2024-07-29 PROCEDURE — 3074F SYST BP LT 130 MM HG: CPT | Mod: CPTII,,,

## 2024-07-29 PROCEDURE — 93005 ELECTROCARDIOGRAM TRACING: CPT

## 2024-07-29 PROCEDURE — 3008F BODY MASS INDEX DOCD: CPT | Mod: CPTII,,,

## 2024-07-29 PROCEDURE — 99214 OFFICE O/P EST MOD 30 MIN: CPT | Mod: S$PBB,,,

## 2024-07-29 RX ORDER — METOPROLOL SUCCINATE 25 MG/1
25 TABLET, EXTENDED RELEASE ORAL DAILY
Qty: 90 TABLET | Refills: 3 | Status: SHIPPED | OUTPATIENT
Start: 2024-07-29 | End: 2025-07-29

## 2024-07-29 NOTE — PATIENT INSTRUCTIONS
EKG today   Follow up in cardiology clinic in 6 months or sooner if needed   Follow up with PCP as directed  Please notify clinic if any new concerns or any change in symptoms

## 2024-07-29 NOTE — PROGRESS NOTES
CHIEF COMPLAINT:   Chief Complaint   Patient presents with    Follow-up     6 mos f/u denies cardiac targets                                                  HPI:  Reza Wellington 58 y.o. male past medical history of AFib, Parkinson's, obesity, GERD and chronic neck and back pain who presents to cardiology clinic for follow up and ongoing care. Carotid ultrasound previously ordered for some orthostatic lightheadedness and dizziness, revealed no significant stenosis in bilateral internal carotid arteries.  See full report below.  At last office visit patient stated that he was feeling well overall and stable from the visit prior.  He continued to have some orthostatic lightheadedness and dizziness but denied any syncopal episodes.  Otherwise he felt fine from a cardiac standpoint.    Today the patient states that he feels better from a cardiac standpoint.  He reports stable symptoms.  He reports very rare episodes of lightheadedness and dizziness that typically occur with positional and postural changes.  He states that he notices these orthostatic changes most if he bends over to pick something up.  Otherwise he denies any further cardiac complaints such as chest pain, SOB, KELLY, palpitations, PND, orthopnea, syncope, peripheral edema, or claudication symptoms.  He was able to complete his ADLs without any issues or ischemic symptoms.  He is fairly active in his daily life and tries to do some form of exercise daily.  He states that he was able to walk several laps around [place] without any issues or ischemic symptoms.  He states that previously because of his neurological issues he was unable to do much physical activity and even some ADLs, but since being on the correct medications he has significantly improved.  He reports compliance with all his current medications.  He denies any tobacco or other illicit drug use.                                                                                                                                                                                                                                                                                                                                                                                                                                                                        CARDIAC TESTING:    CV US BL Doppler Carotid 9.8.23  The right internal carotid artery demonstrated no hemodynamically significant stenosis.  There is no substantial right side carotid plaque identified.  The right vertebral artery is patent with antegrade flow.  The left internal carotid artery demonstrated no hemodynamically significant stenosis.  There is no substantial left side carotid plaque identified.  The left vertebral artery is patent with antegrade flow.      EKG 7.27.23  NSR. Vent rate 64 BPM.     Exercise Stress Test 9.8.21  The patient exercised according to the [facility] for 6:00 min:s, achieving a work level of Max. METS: 7.00.  The resting heart rate of 71 bpm ev to a maximal heart rate of 146 bpm. This value represents 89 % of the  maximal, age-predicted heart rate. The resting blood pressure of 137/112 mmHg , ev to a maximum blood  pressure of 157/99 mmHg. The exercise test was stopped due to Target heart rate achieved.  Interpretation  Summary: Resting ECG: normal.  Chest Pain: none.    Patient Active Problem List   Diagnosis    Umbilical hernia    Tremor    Parkinson's disease    Class 1 obesity due to excess calories with serious comorbidity and body mass index (BMI) of 33.0 to 33.9 in adult    Gastroesophageal reflux disease with esophagitis    Depression with anxiety    Chronic neck pain    Chronic low back pain    Atrial fibrillation with rapid ventricular response    Arthralgia of hip    Dizziness    Muscle spasm    Functional tremor    Hyperlipemia     Past Surgical History:   Procedure Laterality Date    HERNIA REPAIR      KNEE  SURGERY       Social History     Socioeconomic History    Marital status: Single   Tobacco Use    Smoking status: Never    Smokeless tobacco: Never   Substance and Sexual Activity    Alcohol use: Not Currently     Alcohol/week: 1.0 standard drink of alcohol     Types: 1 Cans of beer per week     Comment: monthly beer    Drug use: Never    Sexual activity: Not Currently     Social Determinants of Health     Financial Resource Strain: High Risk (1/17/2024)    Overall Financial Resource Strain (CARDIA)     Difficulty of Paying Living Expenses: Hard   Food Insecurity: Food Insecurity Present (1/17/2024)    Hunger Vital Sign     Worried About Running Out of Food in the Last Year: Sometimes true     Ran Out of Food in the Last Year: Sometimes true   Transportation Needs: Unmet Transportation Needs (1/17/2024)    PRAPARE - Transportation     Lack of Transportation (Medical): No     Lack of Transportation (Non-Medical): Yes   Physical Activity: Unknown (1/17/2024)    Exercise Vital Sign     Days of Exercise per Week: 1 day   Stress: Stress Concern Present (1/17/2024)    [employer] - Occupational Stress Questionnaire     Feeling of Stress : To some extent   Housing Stability: High Risk (1/17/2024)    Housing Stability Vital Sign     Unable to Pay for Housing in the Last Year: Yes     Unstable Housing in the Last Year: No        Family History   Problem Relation Name Age of Onset    Kidney disease Mother Chris Amish     Arthritis Mother Chris Amish     Diabetes Mother Chris Amish     Cancer Maternal Aunt Hiral Tijerina     Depression Paternal Aunt Gila Juniebiwilfredo     Hypertension Paternal Aunt Sarah Esposito     Learning disabilities Daughter Frank Wellington      Review of patient's allergies indicates:  No Known Allergies      ROS:  Review of Systems   Constitutional: Negative.    HENT: Negative.     Eyes: Negative.    Respiratory: Negative.  Negative for shortness of breath.     Cardiovascular: Negative.  Negative for chest pain, palpitations, orthopnea, claudication, leg swelling and PND.   Gastrointestinal: Negative.    Genitourinary: Negative.    Musculoskeletal: Negative.    Skin: Negative.    Neurological:  Positive for dizziness.   Endo/Heme/Allergies: Negative.    Psychiatric/Behavioral: Negative.                                                                                                                                                                                  Negative except as stated in the history of present illness. See HPI for details.    PHYSICAL EXAM:  There were no vitals taken for this visit.        Physical Exam  Constitutional:       Appearance: Normal appearance.   HENT:      Head: Normocephalic.      Nose: Nose normal.      Mouth/Throat:      Mouth: Mucous membranes are moist.   Eyes:      Pupils: Pupils are equal, round, and reactive to light.   Neck:      Vascular: No carotid bruit.   Cardiovascular:      Rate and Rhythm: Normal rate and regular rhythm.      Pulses: Normal pulses.      Heart sounds: Normal heart sounds. No murmur heard.  Pulmonary:      Effort: Pulmonary effort is normal.   Musculoskeletal:         General: Normal range of motion.      Right lower leg: No edema.      Left lower leg: No edema.   Skin:     General: Skin is warm and dry.   Neurological:      General: No focal deficit present.      Mental Status: He is alert and oriented to person, place, and time.   Psychiatric:         Mood and Affect: Mood normal.         Behavior: Behavior normal.         Current Outpatient Medications   Medication Instructions    benztropine (COGENTIN) 1 mg, Oral, 2 times daily    busPIRone (BUSPAR) 15 mg, Oral, 3 times daily    carbidopa-levodopa  mg (SINEMET)  mg per tablet TWO TABLETS EVERY MORNING, TWO TABLETS AT NOON, ONE TABLET EVERY EVENING &  ONE TABLET AT 9pm    ergocalciferol (ERGOCALCIFEROL) 50,000 Units, Oral, Every 7 days     FLUoxetine 20 MG capsule 1 capsule, Oral, Daily    methocarbamoL (ROBAXIN) 500 mg, Oral, Nightly PRN    metoprolol succinate (TOPROL-XL) 25 mg, Oral, Daily    oxymetazoline (VICKS SINEX ULTRA FINE MIST 12) 0.05 % Mist   2 spray(s), PRN PRN congestion, 0 Refill(s)    pantoprazole (PROTONIX) 40 mg, Oral, Daily    rosuvastatin (CRESTOR) 10 mg, Oral, Daily        All medications, laboratory studies, cardiac diagnostic imaging reviewed.     Lab Results   Component Value Date    LDL 92.00 01/18/2024    LDL 82.00 01/13/2023    TRIG 85 01/18/2024    TRIG 84 01/13/2023    CREATININE 1.07 01/18/2024    K 3.9 01/18/2024        ASSESSMENT/PLAN:  Atrial fibrillation with rapid ventricular response  - No change since last office visit   - Stable and asymptomatic- denies any palpitations, lightheadedness, or syncopal episodes   - Currently on metoprolol succinate 25 mg daily and tolerating well  - [id number] score 0 indicating a 0.2% stroke risk per year    Dizziness  - Stable from last visit   - Reports ongoing dizziness and lightheadedness that typically occurs with quick changes in positions.  States that the symptoms usually occur when going from lying down or sitting to standing very quickly.  - Carotid ultrasound completed in September 2023 did not reveal any hemodynamically stenosis of bilateral internal carotid arteries.  Not suggestive NORBERT  - Discussed the importance of changing positions slowly, maintaining adequate hydration with water, and potentially compression socks for further vascular support in lower extremities    Mixed hyperlipidemia  - LDL 92 (1.18.24)  - On Crestor 10 MG daily   - Counseled on low-cholesterol, heart healthy diet exercise as tolerated     GERD  - Management per PCP    Anxiety  - Management per PCP  - Patient states that he uses relaxation techniques to help him calm down, in addition to his medication    /89 today     Follow up in cardiology clinic in 6 months or sooner if needed    Follow up with PCP as directed   Please call cardiac clinic for any change in symptoms

## 2024-09-09 ENCOUNTER — TELEPHONE (OUTPATIENT)
Dept: INTERNAL MEDICINE | Facility: CLINIC | Age: 59
End: 2024-09-09
Payer: COMMERCIAL

## 2024-09-09 NOTE — TELEPHONE ENCOUNTER
----- Message from Brenda Collado sent at 9/9/2024  9:19 AM CDT -----  Regarding: Appt  .Who Called: Reza Wellington's [facility]    Caller is requesting assistance/information from provider's office.    Symptoms (please be specific):    How long has patient had these symptoms:   List of preferred pharmacies on file (remove unneeded): [unfilled]  If different, enter pharmacy into here including location and phone number:       Preferred Method of Contact: Phone Call  Patient's Preferred Phone Number on File: [phone]  Best Call Back Number, if different:  Additional Information: Pt was originally scheduled for July, provider was out, pt was then scheduled in December until he was made aware that his pcp was no longer at the [facility]. Pt was then placed with BAILEY Shukla in January. His daughter wants to know if he can be seen anytime earlier being that his original appt was back in the summer. Please advice.

## 2024-10-17 ENCOUNTER — PATIENT MESSAGE (OUTPATIENT)
Dept: RESEARCH | Facility: HOSPITAL | Age: 59
End: 2024-10-17
Payer: COMMERCIAL

## 2024-11-01 ENCOUNTER — TELEPHONE (OUTPATIENT)
Dept: INTERNAL MEDICINE | Facility: CLINIC | Age: 59
End: 2024-11-01
Payer: COMMERCIAL

## 2024-11-05 ENCOUNTER — OFFICE VISIT (OUTPATIENT)
Dept: INTERNAL MEDICINE | Facility: CLINIC | Age: 59
End: 2024-11-05
Payer: COMMERCIAL

## 2024-11-05 ENCOUNTER — PATIENT MESSAGE (OUTPATIENT)
Dept: RESEARCH | Facility: HOSPITAL | Age: 59
End: 2024-11-05
Payer: COMMERCIAL

## 2024-11-05 ENCOUNTER — LAB VISIT (OUTPATIENT)
Dept: LAB | Facility: HOSPITAL | Age: 59
End: 2024-11-05
Payer: COMMERCIAL

## 2024-11-05 VITALS
TEMPERATURE: 98 F | HEART RATE: 70 BPM | WEIGHT: 232 LBS | BODY MASS INDEX: 32.48 KG/M2 | HEIGHT: 71 IN | DIASTOLIC BLOOD PRESSURE: 77 MMHG | SYSTOLIC BLOOD PRESSURE: 122 MMHG | RESPIRATION RATE: 16 BRPM | OXYGEN SATURATION: 98 %

## 2024-11-05 DIAGNOSIS — E55.9 VITAMIN D DEFICIENCY: ICD-10-CM

## 2024-11-05 DIAGNOSIS — E78.2 MIXED HYPERLIPIDEMIA: ICD-10-CM

## 2024-11-05 DIAGNOSIS — E66.811 CLASS 1 OBESITY DUE TO EXCESS CALORIES WITHOUT SERIOUS COMORBIDITY WITH BODY MASS INDEX (BMI) OF 32.0 TO 32.9 IN ADULT: ICD-10-CM

## 2024-11-05 DIAGNOSIS — Z23 IMMUNIZATION DUE: ICD-10-CM

## 2024-11-05 DIAGNOSIS — I48.91 ATRIAL FIBRILLATION WITH RAPID VENTRICULAR RESPONSE: Chronic | ICD-10-CM

## 2024-11-05 DIAGNOSIS — Z76.89 ENCOUNTER TO ESTABLISH CARE: Primary | ICD-10-CM

## 2024-11-05 DIAGNOSIS — Z12.5 SCREENING FOR MALIGNANT NEOPLASM OF PROSTATE: ICD-10-CM

## 2024-11-05 DIAGNOSIS — E66.09 CLASS 1 OBESITY DUE TO EXCESS CALORIES WITHOUT SERIOUS COMORBIDITY WITH BODY MASS INDEX (BMI) OF 32.0 TO 32.9 IN ADULT: ICD-10-CM

## 2024-11-05 DIAGNOSIS — K21.9 GASTROESOPHAGEAL REFLUX DISEASE WITHOUT ESOPHAGITIS: ICD-10-CM

## 2024-11-05 DIAGNOSIS — E78.2 MIXED HYPERLIPIDEMIA: Chronic | ICD-10-CM

## 2024-11-05 DIAGNOSIS — J06.9 URI WITH COUGH AND CONGESTION: ICD-10-CM

## 2024-11-05 LAB
25(OH)D3+25(OH)D2 SERPL-MCNC: 32 NG/ML (ref 30–80)
ALBUMIN SERPL-MCNC: 4 G/DL (ref 3.4–4.8)
ALBUMIN/GLOB SERPL: 1.1 RATIO (ref 1.1–2)
ALP SERPL-CCNC: 99 UNIT/L (ref 40–150)
ALT SERPL-CCNC: 10 UNIT/L (ref 0–55)
ANION GAP SERPL CALC-SCNC: 7 MEQ/L
AST SERPL-CCNC: 15 UNIT/L (ref 5–34)
BACTERIA #/AREA URNS AUTO: ABNORMAL /HPF
BASOPHILS # BLD AUTO: 0.04 X10(3)/MCL
BASOPHILS NFR BLD AUTO: 0.6 %
BILIRUB SERPL-MCNC: 0.7 MG/DL
BILIRUB UR QL STRIP.AUTO: NEGATIVE
BUN SERPL-MCNC: 15.7 MG/DL (ref 8.4–25.7)
CALCIUM SERPL-MCNC: 8.7 MG/DL (ref 8.8–10)
CHLORIDE SERPL-SCNC: 105 MMOL/L (ref 98–107)
CHOLEST SERPL-MCNC: 134 MG/DL
CHOLEST/HDLC SERPL: 4 {RATIO} (ref 0–5)
CLARITY UR: CLEAR
CO2 SERPL-SCNC: 28 MMOL/L (ref 23–31)
COLOR UR AUTO: ABNORMAL
CREAT SERPL-MCNC: 1.03 MG/DL (ref 0.72–1.25)
CREAT/UREA NIT SERPL: 15
EOSINOPHIL # BLD AUTO: 0.2 X10(3)/MCL (ref 0–0.9)
EOSINOPHIL NFR BLD AUTO: 3.2 %
ERYTHROCYTE [DISTWIDTH] IN BLOOD BY AUTOMATED COUNT: 12.4 % (ref 11.5–17)
EST. AVERAGE GLUCOSE BLD GHB EST-MCNC: 116.9 MG/DL
GFR SERPLBLD CREATININE-BSD FMLA CKD-EPI: >60 ML/MIN/1.73/M2
GLOBULIN SER-MCNC: 3.8 GM/DL (ref 2.4–3.5)
GLUCOSE SERPL-MCNC: 104 MG/DL (ref 82–115)
GLUCOSE UR QL STRIP: ABNORMAL
HBA1C MFR BLD: 5.7 %
HCT VFR BLD AUTO: 45.7 % (ref 42–52)
HDLC SERPL-MCNC: 36 MG/DL (ref 35–60)
HGB BLD-MCNC: 15.7 G/DL (ref 14–18)
HGB UR QL STRIP: NEGATIVE
HYALINE CASTS #/AREA URNS LPF: ABNORMAL /LPF
IMM GRANULOCYTES # BLD AUTO: 0.01 X10(3)/MCL (ref 0–0.04)
IMM GRANULOCYTES NFR BLD AUTO: 0.2 %
KETONES UR QL STRIP: NEGATIVE
LDLC SERPL CALC-MCNC: 83 MG/DL (ref 50–140)
LEUKOCYTE ESTERASE UR QL STRIP: NEGATIVE
LYMPHOCYTES # BLD AUTO: 1.12 X10(3)/MCL (ref 0.6–4.6)
LYMPHOCYTES NFR BLD AUTO: 17.9 %
MCH RBC QN AUTO: 32.1 PG (ref 27–31)
MCHC RBC AUTO-ENTMCNC: 34.4 G/DL (ref 33–36)
MCV RBC AUTO: 93.5 FL (ref 80–94)
MONOCYTES # BLD AUTO: 0.61 X10(3)/MCL (ref 0.1–1.3)
MONOCYTES NFR BLD AUTO: 9.8 %
MUCOUS THREADS URNS QL MICRO: ABNORMAL /LPF
NEUTROPHILS # BLD AUTO: 4.26 X10(3)/MCL (ref 2.1–9.2)
NEUTROPHILS NFR BLD AUTO: 68.3 %
NITRITE UR QL STRIP: NEGATIVE
NRBC BLD AUTO-RTO: 0 %
PH UR STRIP: 7 [PH]
PLATELET # BLD AUTO: 154 X10(3)/MCL (ref 130–400)
PMV BLD AUTO: 11.4 FL (ref 7.4–10.4)
POTASSIUM SERPL-SCNC: 4.1 MMOL/L (ref 3.5–5.1)
PROT SERPL-MCNC: 7.8 GM/DL (ref 5.8–7.6)
PROT UR QL STRIP: NEGATIVE
PSA SERPL-MCNC: 0.41 NG/ML
RBC # BLD AUTO: 4.89 X10(6)/MCL (ref 4.7–6.1)
RBC #/AREA URNS AUTO: ABNORMAL /HPF
SODIUM SERPL-SCNC: 140 MMOL/L (ref 136–145)
SP GR UR STRIP.AUTO: 1.02 (ref 1–1.03)
SQUAMOUS #/AREA URNS LPF: ABNORMAL /HPF
TRIGL SERPL-MCNC: 77 MG/DL (ref 34–140)
UROBILINOGEN UR STRIP-ACNC: NORMAL
VLDLC SERPL CALC-MCNC: 15 MG/DL
WBC # BLD AUTO: 6.24 X10(3)/MCL (ref 4.5–11.5)
WBC #/AREA URNS AUTO: ABNORMAL /HPF

## 2024-11-05 PROCEDURE — 1159F MED LIST DOCD IN RCRD: CPT | Mod: CPTII,,,

## 2024-11-05 PROCEDURE — 3078F DIAST BP <80 MM HG: CPT | Mod: CPTII,,,

## 2024-11-05 PROCEDURE — 82306 VITAMIN D 25 HYDROXY: CPT

## 2024-11-05 PROCEDURE — 1160F RVW MEDS BY RX/DR IN RCRD: CPT | Mod: CPTII,,,

## 2024-11-05 PROCEDURE — 36415 COLL VENOUS BLD VENIPUNCTURE: CPT

## 2024-11-05 PROCEDURE — 96372 THER/PROPH/DIAG INJ SC/IM: CPT | Mod: PBBFAC,59

## 2024-11-05 PROCEDURE — 80061 LIPID PANEL: CPT

## 2024-11-05 PROCEDURE — 84153 ASSAY OF PSA TOTAL: CPT

## 2024-11-05 PROCEDURE — 99215 OFFICE O/P EST HI 40 MIN: CPT | Mod: PBBFAC

## 2024-11-05 PROCEDURE — 83036 HEMOGLOBIN GLYCOSYLATED A1C: CPT

## 2024-11-05 PROCEDURE — 99215 OFFICE O/P EST HI 40 MIN: CPT | Mod: S$PBB,,,

## 2024-11-05 PROCEDURE — G0008 ADMIN INFLUENZA VIRUS VAC: HCPCS | Mod: PBBFAC

## 2024-11-05 PROCEDURE — 3074F SYST BP LT 130 MM HG: CPT | Mod: CPTII,,,

## 2024-11-05 PROCEDURE — 90656 IIV3 VACC NO PRSV 0.5 ML IM: CPT | Mod: PBBFAC

## 2024-11-05 PROCEDURE — 81015 MICROSCOPIC EXAM OF URINE: CPT

## 2024-11-05 PROCEDURE — 80053 COMPREHEN METABOLIC PANEL: CPT

## 2024-11-05 PROCEDURE — 85025 COMPLETE CBC W/AUTO DIFF WBC: CPT

## 2024-11-05 PROCEDURE — 3008F BODY MASS INDEX DOCD: CPT | Mod: CPTII,,,

## 2024-11-05 RX ORDER — CETIRIZINE HYDROCHLORIDE 10 MG/1
TABLET ORAL
Qty: 90 TABLET | Refills: 2 | Status: SHIPPED | OUTPATIENT
Start: 2024-11-05 | End: 2025-11-05

## 2024-11-05 RX ORDER — BENZONATATE 100 MG/1
100 CAPSULE ORAL 3 TIMES DAILY PRN
Qty: 30 CAPSULE | Refills: 0 | Status: SHIPPED | OUTPATIENT
Start: 2024-11-05 | End: 2024-11-15

## 2024-11-05 RX ORDER — METHYLPREDNISOLONE 4 MG/1
TABLET ORAL
Qty: 21 EACH | Refills: 0 | Status: SHIPPED | OUTPATIENT
Start: 2024-11-05

## 2024-11-05 RX ORDER — DEXAMETHASONE SODIUM PHOSPHATE 4 MG/ML
8 INJECTION, SOLUTION INTRA-ARTICULAR; INTRALESIONAL; INTRAMUSCULAR; INTRAVENOUS; SOFT TISSUE
Status: COMPLETED | OUTPATIENT
Start: 2024-11-05 | End: 2024-11-05

## 2024-11-05 RX ORDER — FLUTICASONE PROPIONATE 50 MCG
SPRAY, SUSPENSION (ML) NASAL
Qty: 15.8 ML | Refills: 3 | Status: SHIPPED | OUTPATIENT
Start: 2024-11-05 | End: 2024-11-25

## 2024-11-05 RX ORDER — PANTOPRAZOLE SODIUM 40 MG/1
40 TABLET, DELAYED RELEASE ORAL DAILY
Qty: 90 TABLET | Refills: 2 | Status: SHIPPED | OUTPATIENT
Start: 2024-11-05 | End: 2025-11-05

## 2024-11-05 RX ADMIN — INFLUENZA VIRUS VACCINE 0.5 ML: 15; 15; 15 SUSPENSION INTRAMUSCULAR at 07:11

## 2024-11-05 RX ADMIN — DEXAMETHASONE SODIUM PHOSPHATE 8 MG: 4 INJECTION, SOLUTION INTRA-ARTICULAR; INTRALESIONAL; INTRAMUSCULAR; INTRAVENOUS; SOFT TISSUE at 08:11

## 2024-11-05 NOTE — PROGRESS NOTES
PATIENT NAME: Reza Wellington  : 1965  DATE: 24  MRN: [id number]          Reason for Visit/Chief Complaint   Establish Care, Nasal Congestion, and alllergies       History of Present Illness (HPI)     Reza Wellington is a 59 y.o. Black male presenting in clinic today Establish Care, Nasal Congestion, and alllergies. PMH: A-Fib, Parkinson's Disease, Anxiety/Depression, Chronic Neck/Lumbar Pain, Obesity, Hx of Umbilical Hernia.  Previous PCP: ALESSANDRO Navarro - last office visit on 2024.     Patient is managed by:  [facility] cardiology clinic for Afib. Last office visit on 2024.  CV US BL Doppler Carotid 9.8.23  The right internal carotid artery demonstrated no hemodynamically significant stenosis.  There is no substantial right side carotid plaque identified.  The right vertebral artery is patent with antegrade flow.  The left internal carotid artery demonstrated no hemodynamically significant stenosis.  There is no substantial left side carotid plaque identified.  The left vertebral artery is patent with antegrade flow.      EKG 7.27.23  NSR. Vent rate 64 BPM.      Exercise Stress Test 9.8.21  The patient exercised according to the KAILA for 6:00 min:s, achieving a work level of Max. METS: 7.00.  The resting heart rate of 71 bpm ev to a maximal heart rate of 146 bpm. This value represents 89 % of the  maximal, age-predicted heart rate. The resting blood pressure of 137/112 mmHg , ev to a maximum blood  pressure of 157/99 mmHg. The exercise test was stopped due to Target heart rate achieved.  Interpretation  Summary: Resting ECG: normal.  Chest Pain: none.    [facility] neurology clinic for tremor disorder. Last office visit on 2024. Benztropine 1 mg BID (10/5/2022 to present)  Sinemet 25mg - 100mg QID (2021 - present) - 2 tab at 8 AM - 2 tab at 12 PM - 1 tab at 4PM - 1 tab at 9:30 PM.     [facility] ophthalmology clinic for right epidermal cyst. Last office visit on 4/15/2024.     Patient  "reports having some nasal congestion, sneezing, productive cough with "yellow" sputum for approx 1 1/2 weeks. Denies fever, chills, SOB, or sick contacts. He has tried juaquin-seltzer plus, mucinex, cetirizine once without any relief.     Denies smoking, drinking, or illicit drug use. Denies chest pain, shortness of breath, cough, headache, dizziness, weakness, abdominal pain, nausea, vomiting, diarrhea, constipation, dysuria, depression, anxiety, SI, and HI.    Prostate Cancer Screening - Last PSA - 0.36 on 6/30/2021. Follow up annually. (PSA ordered).  Colon Cancer Screening - Cologuard negative on 8/5/2022. Repeat in 3 years.  Eye Exam -Several years. List of local eye doctors given to patient.   Dental Exam - Several years. List of local dentists given to patient.   Vaccinations: Flu - 11/5/2024 / Shingles - Declines / Tetanus - 6/29/2024       Pt has 1 or more chronic illnesses with severe exacerbation, progression, or side effects of treatment / 1 acute or chronic illness or injury that poses a threat to life or bodily function.  I have reviewed prior external notes / reviewed results of each unique test /ordered a unique test /  This assessment required an independent historian.  Management of this patient was discussed with an external physician / other qhp / or appropriate source that was not separately reported.  There is high risk of morbidity from additional diagnostic testing or treatment including prescription drug management. Diagnosis & treatment are significantly limited by social determinants of health.   I spent a total of 40 minutes reviewing the patient's chart prior to our face to face time, seeing the patient, speaking with nurse, and documenting in the record.  (Npt 60-74 mins / Est 40-54 mins)     Review of Systems     Review of Systems   Constitutional: Negative.  Negative for chills and fever.   HENT:  Positive for congestion. Negative for sore throat.    Eyes: Negative.    Respiratory:  " Positive for cough.    Cardiovascular: Negative.    Gastrointestinal: Negative.    Endocrine: Negative.    Genitourinary: Negative.    Musculoskeletal: Negative.    Skin: Negative.    Allergic/Immunologic: Negative.    Neurological: Negative.    Hematological: Negative.    Psychiatric/Behavioral: Negative.     All other systems reviewed and are negative.      Medical / Social / Family History     Past Medical History:   Diagnosis Date    Anxiety     Atrial fibrillation     Chronic cervical pain     Chronic lumbar pain     Depression     Obesity, unspecified     Parkinson's disease     Umbilical hernia          Past Surgical History:   Procedure Laterality Date    HERNIA REPAIR      KNEE SURGERY           Social History  Reza Lima  reports that he has never smoked. He has never used smokeless tobacco. He reports that he does not currently use alcohol after a past usage of about 1.0 standard drink of alcohol per week. He reports that he does not use drugs.    Family History  Reza Pals family history includes Arthritis in his mother; Cancer in his maternal aunt; Depression in his paternal aunt; Diabetes in his mother; Hypertension in his paternal aunt; Kidney disease in his mother; Learning disabilities in his daughter.    Medications and Allergies     Medications  Current Outpatient Medications   Medication Instructions    benzonatate (TESSALON) 100 mg, Oral, 3 times daily PRN    benztropine (COGENTIN) 1 mg, Oral, 2 times daily    busPIRone (BUSPAR) 15 mg, 3 times daily    carbidopa-levodopa  mg (SINEMET)  mg per tablet TWO TABLETS EVERY MORNING, TWO TABLETS AT NOON, ONE TABLET EVERY EVENING &  ONE TABLET AT 9pm    cetirizine (ZYRTEC) 10 MG tablet Take 1 tablet (10 mg total) by mouth once daily for 10 days, THEN 1 tablet (10 mg total) daily as needed for Allergies or Rhinitis.    FLUoxetine 20 MG capsule 1 capsule, Daily    fluticasone propionate (FLONASE) 50 mcg/actuation nasal spray 1  "spray (50 mcg total) by Each Nostril route 2 (two) times a day for 10 days, THEN 1 spray (50 mcg total) 2 (two) times daily as needed for Rhinitis or Allergies.    methylPREDNISolone (MEDROL DOSEPACK) 4 mg tablet use as directed    metoprolol succinate (TOPROL-XL) 25 mg, Oral, Daily    oxymetazoline (VICKS SINEX ULTRA FINE MIST 12) 0.05 % Mist   2 spray(s), PRN PRN congestion, 0 Refill(s)    pantoprazole (PROTONIX) 40 mg, Oral, Daily    rosuvastatin (CRESTOR) 10 mg, Oral, Daily       Allergies  Review of patient's allergies indicates:  No Known Allergies    Physical Examination   Visit Vitals  /77 (BP Location: Right arm, Patient Position: Sitting)   Pulse 70   Temp 97.9 °F (36.6 °C) (Oral)   Resp 16   Ht 5' 11" (1.803 m)   Wt 105.2 kg (232 lb)   SpO2 98%   BMI 32.36 kg/m²     Physical Exam  Vitals reviewed.   Constitutional:       Appearance: Normal appearance. He is obese.   HENT:      Head: Normocephalic.      Right Ear: Hearing, tympanic membrane and external ear normal. No middle ear effusion. There is no impacted cerumen.      Left Ear: Hearing, tympanic membrane and external ear normal.  No middle ear effusion. There is no impacted cerumen.      Nose: Nose normal.      Mouth/Throat:      Mouth: Mucous membranes are moist.      Pharynx: Oropharynx is clear. Posterior oropharyngeal erythema present.   Eyes:      Extraocular Movements: Extraocular movements intact.      Conjunctiva/sclera: Conjunctivae normal.      Pupils: Pupils are equal, round, and reactive to light.   Cardiovascular:      Rate and Rhythm: Normal rate and regular rhythm.      Heart sounds: Normal heart sounds.   Pulmonary:      Effort: Pulmonary effort is normal.      Breath sounds: Normal breath sounds.   Abdominal:      General: Abdomen is flat. Bowel sounds are normal.      Palpations: Abdomen is soft.   Musculoskeletal:         General: Normal range of motion.      Cervical back: Normal range of motion.   Skin:     General: Skin is " "warm and dry.      Capillary Refill: Capillary refill takes less than 2 seconds.   Neurological:      General: No focal deficit present.      Mental Status: He is alert and oriented to person, place, and time.   Psychiatric:         Mood and Affect: Mood normal.           Results     Lab Results   Component Value Date    WBC 4.92 01/18/2024    RBC 5.05 01/18/2024    HGB 15.8 01/18/2024    HCT 46.6 01/18/2024    MCV 92.3 01/18/2024    MCH 31.3 (H) 01/18/2024    MCHC 33.9 01/18/2024    RDW 12.8 01/18/2024     01/18/2024    MPV 12.0 (H) 01/18/2024      Lab Results   Component Value Date     01/18/2024    K 3.9 01/18/2024    CO2 29 01/18/2024    GLUCOSE 106 01/18/2024    BUN 19.4 01/18/2024    CREATININE 1.07 01/18/2024    LABPROT 7.7 (H) 01/18/2024    ALBUMIN 3.9 01/18/2024    BILITOT 0.8 01/18/2024    ALKPHOS 93 01/18/2024    AST 13 01/18/2024    ALT <5 01/18/2024    EGFRNORACEVR >60 01/18/2024     Lab Results   Component Value Date    TSH 0.948 01/18/2024     Lab Results   Component Value Date    CHOL 147 01/18/2024    HDL 38 01/18/2024    LDL 92.00 01/18/2024    TRIG 85 01/18/2024     Lab Results   Component Value Date    SGUA 1.015 01/13/2023    PROTEINUA Negative 01/13/2023    BILIRUBINUA Negative 01/13/2023    WBCUA 0-5 01/13/2023    RBCUA 0-5 01/13/2023    BACTERIA Trace (A) 01/13/2023    LEUKOCYTESUR Negative 01/13/2023    UROBILINOGEN Normal 01/13/2023     Lab Results   Component Value Date    CREATRANDUR 200.5 (H) 01/18/2024    MICALBCREAT 3.9 01/18/2024     Lab Results   Component Value Date    AZGDWGOZ42RM 33.9 01/18/2024    FOLATE 3.9 (L) 01/13/2023     Lab Results   Component Value Date    HIV Nonreactive 01/18/2024    HEPCAB Nonreactive 01/18/2024     Lab Results   Component Value Date    COLOGUARD Negative 08/08/2022     No results found for: "OCCBLDIA"    Assessment and Plan (including Health Maintenance)     Problem List Items Addressed This Visit          ENT    URI with cough and " congestion    Current Assessment & Plan     Dexamethasone 8 mg IM x1.  Rx medrol dose pack.  Rx cetirizine.  Rx fluticasone.  Use OTC cold meds for symptoms (HTN and DM pt to avoid Sudafed or pseudoephedrine products).   Use OTC Tylenol or Advil for fever or body aches.   Get plenty of rest, eat a healthy diet, avoid alcohol and smoking.   Sore Throat: Use OTC lozenges or throat sprays, gargle with warm salt and water, warm tea with honey.   Nasal Congestion: Use OTC Mucinex D, humidifier or steamy shower.   Cough: Use Dextromethorphan/Doxylamine Cough Syrup at night.   Report fever greater than 101 F, breathing problems, painful or worsening cough, or changes in mucous (green, yellow, bloody).   Cover your mouth with coughing, avoid sharing cups or lip balm, wash your hand before eating or touching your face.            Relevant Medications    dexAMETHasone injection 8 mg (Completed)    fluticasone propionate (FLONASE) 50 mcg/actuation nasal spray    cetirizine (ZYRTEC) 10 MG tablet    methylPREDNISolone (MEDROL DOSEPACK) 4 mg tablet    benzonatate (TESSALON) 100 MG capsule       Cardiac/Vascular    Atrial fibrillation with rapid ventricular response (Chronic)    Current Assessment & Plan     Managed by [facility] cardiology.   Denies palpitations.  Continue metoprolol as prescribed.  ED precautions.         Relevant Orders    CBC Auto Differential    Comprehensive Metabolic Panel    Microalbumin/Creatinine Ratio, Urine    Urinalysis, Reflex to Urine Culture    Hyperlipemia (Chronic)    Current Assessment & Plan     Low fat diet.  Lipid panel ordered.  Continue rosuvastatin as prescribed.         Relevant Orders    CBC Auto Differential    Comprehensive Metabolic Panel    Lipid Panel    Microalbumin/Creatinine Ratio, Urine    Urinalysis, Reflex to Urine Culture    Lipid Panel       Renal/    Screening for malignant neoplasm of prostate    Current Assessment & Plan     Screening PSA ordered.         Relevant Orders     PSA, Screening       ID    Immunization due    Current Assessment & Plan     Influenza vaccine was administered.  Ok to take acetaminophen for soreness of arm.          Relevant Medications    influenza (Flulaval, Fluzone, Fluarix) 45 mcg/0.5 mL IM vaccine (> or = 6 mo) 0.5 mL (Completed)       Endocrine    Class 1 obesity due to excess calories without serious comorbidity with body mass index (BMI) of 32.0 to 32.9 in adult    Current Assessment & Plan     Body mass index is 32.36 kg/m².  Goal BMI <30.  Aerobic exercise 150 minutes per week.  Avoid soda, simple sugars, sweets, excessive rice, pasta, potatoes or bread.   Choose brown options when available and portion control.  Limit fast foods and fried foods.   Choose complex carbs in moderation (ex: green, leafy vegetables, beans, oatmeal).  Eat plenty of fresh fruits and vegetables with lean meats daily.   Consider permanent healthy lifestyle changes.          Relevant Orders    Hemoglobin A1C    Vitamin D deficiency    Current Assessment & Plan     Vitamin D level ordered.         Relevant Orders    Vitamin D    Vitamin D       GI    Gastroesophageal reflux disease without esophagitis    Current Assessment & Plan     Avoid spicy, acidic, fried food and alcohol.    Eat 2-3 hours before bed.   Avoid tight fitting clothes and chew food thoroughly.    Reduce caffeine intake, avoid soda.    Take pantoprazole as prescribed - refilled today.            Relevant Medications    pantoprazole (PROTONIX) 40 MG tablet       Other    Encounter to establish care - Primary    Current Assessment & Plan     Wellness labs ordered - to be discussed at next office visit.  Encouraged patient to utilize patient portal for messaging.  Records from previous PCP in EMR.               Health Maintenance Due   Topic Date Due    PROSTATE-SPECIFIC ANTIGEN  04/11/2023    RSV Vaccine (Age 60+ and Pregnant patients) (1 - Risk 60-74 years 1-dose series) Never done    COVID-19 Vaccine (5 -  2024-25 season) 09/01/2024     All of your core healthy metrics are met.      Health Maintenance Topics with due status: Not Due       Topic Last Completion Date    TETANUS VACCINE 06/29/2021    Hemoglobin A1c (Diabetic Prevention Screening) 04/11/2022    Colorectal Cancer Screening 08/08/2022    Lipid Panel 01/18/2024       Future Appointments   Date Time Provider Department Center   11[facility]5/2024  [facility]   12/3/2024  8:45 AM Francoise Barrera MD [facility] NEURO [facility]   1/29/2025  9:45 AM Liliana Ortega PA-C [facility] CARD [facility]   4/15/2025  8:30 AM [facility]    5/5/2025  7:20 AM Jenni Shukla FNP [facility]         Follow up in about 6 months (around 5/5/2025) for F2F, Follow up, Med check, Lab review, RTC PRN.          Signature:        ALESSANDRO Huang  [facility]  [address] [facility] [address]    Date of encounter: 11/5/24

## 2024-11-05 NOTE — ASSESSMENT & PLAN NOTE
Dexamethasone 8 mg IM x1.  Rx medrol dose pack.  Rx cetirizine.  Rx fluticasone.  Use OTC cold meds for symptoms (HTN and DM pt to avoid Sudafed or pseudoephedrine products).   Use OTC Tylenol or Advil for fever or body aches.   Get plenty of rest, eat a healthy diet, avoid alcohol and smoking.   Sore Throat: Use OTC lozenges or throat sprays, gargle with warm salt and water, warm tea with honey.   Nasal Congestion: Use OTC Mucinex D, humidifier or steamy shower.   Cough: Use Dextromethorphan/Doxylamine Cough Syrup at night.   Report fever greater than 101 F, breathing problems, painful or worsening cough, or changes in mucous (green, yellow, bloody).   Cover your mouth with coughing, avoid sharing cups or lip balm, wash your hand before eating or touching your face.

## 2024-11-05 NOTE — ASSESSMENT & PLAN NOTE
Avoid spicy, acidic, fried food and alcohol.    Eat 2-3 hours before bed.   Avoid tight fitting clothes and chew food thoroughly.    Reduce caffeine intake, avoid soda.    Take pantoprazole as prescribed - refilled today.

## 2024-11-05 NOTE — ASSESSMENT & PLAN NOTE
Body mass index is 32.36 kg/m².  Goal BMI <30.  Aerobic exercise 150 minutes per week.  Avoid soda, simple sugars, sweets, excessive rice, pasta, potatoes or bread.   Choose brown options when available and portion control.  Limit fast foods and fried foods.   Choose complex carbs in moderation (ex: green, leafy vegetables, beans, oatmeal).  Eat plenty of fresh fruits and vegetables with lean meats daily.   Consider permanent healthy lifestyle changes.

## 2024-11-05 NOTE — ASSESSMENT & PLAN NOTE
Wellness labs ordered - to be discussed at next office visit.  Encouraged patient to utilize patient portal for messaging.  Records from previous PCP in EMR.

## 2024-11-05 NOTE — PATIENT INSTRUCTIONS
REMINDER: Please complete labs within 1 week of appointment.   Please complete satisfaction survey when received. Thank you.    Khari Cobb,     If you are due for any health screening(s) below please notify me so we can arrange them to be ordered and scheduled. Most healthy patients at your age complete them, but you are free to accept or refuse.     If you can't do it, I'll definitely understand. If you can, I'd certainly appreciate it!    All of your core healthy metrics are met.

## 2024-11-05 NOTE — ASSESSMENT & PLAN NOTE
Managed by [facility].   Denies palpitations.  Continue metoprolol as prescribed.  ED precautions.
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
## 2024-12-03 ENCOUNTER — OFFICE VISIT (OUTPATIENT)
Dept: NEUROLOGY | Facility: CLINIC | Age: 59
End: 2024-12-03
Payer: COMMERCIAL

## 2024-12-03 VITALS
TEMPERATURE: 99 F | BODY MASS INDEX: 32.37 KG/M2 | HEIGHT: 71 IN | SYSTOLIC BLOOD PRESSURE: 134 MMHG | RESPIRATION RATE: 18 BRPM | WEIGHT: 231.19 LBS | DIASTOLIC BLOOD PRESSURE: 77 MMHG | HEART RATE: 60 BPM | OXYGEN SATURATION: 100 %

## 2024-12-03 DIAGNOSIS — G20.A1 PARKINSON'S DISEASE WITHOUT DYSKINESIA OR FLUCTUATING MANIFESTATIONS: Primary | ICD-10-CM

## 2024-12-03 PROCEDURE — 3078F DIAST BP <80 MM HG: CPT | Mod: CPTII,,, | Performed by: PSYCHIATRY & NEUROLOGY

## 2024-12-03 PROCEDURE — 1159F MED LIST DOCD IN RCRD: CPT | Mod: CPTII,,, | Performed by: PSYCHIATRY & NEUROLOGY

## 2024-12-03 PROCEDURE — 3044F HG A1C LEVEL LT 7.0%: CPT | Mod: CPTII,,, | Performed by: PSYCHIATRY & NEUROLOGY

## 2024-12-03 PROCEDURE — 3008F BODY MASS INDEX DOCD: CPT | Mod: CPTII,,, | Performed by: PSYCHIATRY & NEUROLOGY

## 2024-12-03 PROCEDURE — 99214 OFFICE O/P EST MOD 30 MIN: CPT | Mod: S$PBB,,, | Performed by: PSYCHIATRY & NEUROLOGY

## 2024-12-03 PROCEDURE — 99214 OFFICE O/P EST MOD 30 MIN: CPT | Mod: PBBFAC | Performed by: PSYCHIATRY & NEUROLOGY

## 2024-12-03 PROCEDURE — 3075F SYST BP GE 130 - 139MM HG: CPT | Mod: CPTII,,, | Performed by: PSYCHIATRY & NEUROLOGY

## 2024-12-03 PROCEDURE — G2211 COMPLEX E/M VISIT ADD ON: HCPCS | Mod: S$PBB,,, | Performed by: PSYCHIATRY & NEUROLOGY

## 2024-12-03 RX ORDER — CARBIDOPA AND LEVODOPA 25; 100 MG/1; MG/1
TABLET ORAL
Qty: 540 TABLET | Refills: 4 | Status: SHIPPED | OUTPATIENT
Start: 2024-12-03

## 2024-12-03 RX ORDER — BENZTROPINE MESYLATE 1 MG/1
1 TABLET ORAL 2 TIMES DAILY
Qty: 180 TABLET | Refills: 1 | Status: SHIPPED | OUTPATIENT
Start: 2024-12-03 | End: 2025-06-01

## 2024-12-03 RX ORDER — FLUTICASONE PROPIONATE 50 MCG
1 SPRAY, SUSPENSION (ML) NASAL 2 TIMES DAILY
COMMUNITY
Start: 2024-11-05

## 2025-01-28 NOTE — PROGRESS NOTES
CHIEF COMPLAINT:   No chief complaint on file.                                                 HPI:  Reza Wellington 59 y.o. male past medical history of AFib, Parkinson's, obesity, GERD and chronic neck and back pain who presents to cardiology clinic for follow up and ongoing care. Carotid ultrasound previously ordered for some orthostatic lightheadedness and dizziness, revealed no significant stenosis in bilateral internal carotid arteries.  See full report below.  At last office visit patient stated that he felt well from a cardiac standpoint.  His only complaint was rare episodes of lightheadedness and dizziness that occurred with positional and postural changes.    Today the patient states some SOB/KELLY that occurs with high levels of activity.  He states it mostly occurs with prolonged or far ambulation, or with lifting heavy things. He also endorses orthostatic symptoms that occur bending over and standing as well as with quick positional/postural changes.  Otherwise he denies any further cardiac complaints such as chest pain, palpitations, PND, orthopnea, near-syncope, syncope, lower extremity edema, or claudication symptoms.  He is able to complete his ADLs without any issues or ischemic symptoms.  He states that he is fairly active in his daily life and he does try to walk for exercise.  He states that previously his neurological medications would interfere with his ability to do complete physical activity, but since being on the correct medications, his physical activity levels have significantly improved and he feels much better overall.  He reports compliance with all his current medications and he is tolerating them well.  Denies any tobacco or other illicit drug use.                                                                                                                                                                                                                                                                                                                                                                                                                                                                    CARDIAC TESTING:    CV US BL Doppler Carotid 9.8.23  The right internal carotid artery demonstrated no hemodynamically significant stenosis.  There is no substantial right side carotid plaque identified.  The right vertebral artery is patent with antegrade flow.  The left internal carotid artery demonstrated no hemodynamically significant stenosis.  There is no substantial left side carotid plaque identified.  The left vertebral artery is patent with antegrade flow.      EKG 7.27.23  NSR. Vent rate 64 BPM.     Exercise Stress Test 9.8.21  The patient exercised according to the KAILA for 6:00 min:s, achieving a work level of Max. METS: 7.00.  The resting heart rate of 71 bpm ev to a maximal heart rate of 146 bpm. This value represents 89 % of the  maximal, age-predicted heart rate. The resting blood pressure of 137/112 mmHg , ev to a maximum blood  pressure of 157/99 mmHg. The exercise test was stopped due to Target heart rate achieved.  Interpretation  Summary: Resting ECG: normal.  Chest Pain: none.    Patient Active Problem List   Diagnosis    Umbilical hernia    Tremor    Parkinson's disease    Class 1 obesity due to excess calories without serious comorbidity with body mass index (BMI) of 32.0 to 32.9 in adult    Gastroesophageal reflux disease with esophagitis    Depression with anxiety    Chronic neck pain    Chronic low back pain    Atrial fibrillation with rapid ventricular response    Arthralgia of hip    Dizziness    Muscle spasm    Functional tremor    Hyperlipemia    Encounter to establish care    URI with cough and congestion    Vitamin D deficiency    Screening for malignant neoplasm of prostate    Gastroesophageal reflux disease without esophagitis    Immunization due     Past Surgical  History:   Procedure Laterality Date    HERNIA REPAIR      KNEE SURGERY       Social History     Socioeconomic History    Marital status: Single   Tobacco Use    Smoking status: Never    Smokeless tobacco: Never   Substance and Sexual Activity    Alcohol use: Not Currently     Alcohol/week: 1.0 standard drink of alcohol     Types: 1 Cans of beer per week     Comment: monthly beer    Drug use: Never    Sexual activity: Not Currently     Social Drivers of Health     Financial Resource Strain: High Risk (1/17/2024)    Overall Financial Resource Strain (CARDIA)     Difficulty of Paying Living Expenses: Hard   Food Insecurity: Food Insecurity Present (1/17/2024)    Hunger Vital Sign     Worried About Running Out of Food in the Last Year: Sometimes true     Ran Out of Food in the Last Year: Sometimes true   Transportation Needs: Unmet Transportation Needs (1/17/2024)    PRAPARE - Transportation     Lack of Transportation (Medical): No     Lack of Transportation (Non-Medical): Yes   Physical Activity: Unknown (1/17/2024)    Exercise Vital Sign     Days of Exercise per Week: 1 day   Stress: Stress Concern Present (1/17/2024)    [employer] - Occupational Stress Questionnaire     Feeling of Stress : To some extent   Housing Stability: High Risk (1/17/2024)    Housing Stability Vital Sign     Unable to Pay for Housing in the Last Year: Yes     Unstable Housing in the Last Year: No        Family History   Problem Relation Name Age of Onset    Kidney disease Mother Chris Amish     Arthritis Mother Chris Amish     Diabetes Mother Chris Amish     Cancer Maternal Aunt Hiral Tijerina     Depression Paternal Aunt [place] Juniebiwilfredo     Hypertension Paternal Aunt Sarah Esposito     Learning disabilities Daughter Frank Wellington      Review of patient's allergies indicates:  No Known Allergies      ROS:  Review of Systems   Constitutional: Negative.    HENT: Negative.     Eyes: Negative.     Respiratory: Negative.  Negative for shortness of breath.    Cardiovascular: Negative.  Negative for chest pain, palpitations, orthopnea, claudication, leg swelling and PND.   Gastrointestinal: Negative.    Genitourinary: Negative.    Musculoskeletal: Negative.    Skin: Negative.    Neurological:  Positive for dizziness.   Endo/Heme/Allergies: Negative.    Psychiatric/Behavioral: Negative.                                                                                                                                                                                  Negative except as stated in the history of present illness. See HPI for details.    PHYSICAL EXAM:  There were no vitals taken for this visit.        Physical Exam  Constitutional:       Appearance: Normal appearance.   HENT:      Head: Normocephalic.      Nose: Nose normal.      Mouth/Throat:      Mouth: Mucous membranes are moist.   Eyes:      Pupils: Pupils are equal, round, and reactive to light.   Neck:      Vascular: No carotid bruit.   Cardiovascular:      Rate and Rhythm: Normal rate and regular rhythm.      Pulses: Normal pulses.      Heart sounds: Normal heart sounds. No murmur heard.  Pulmonary:      Effort: Pulmonary effort is normal.   Musculoskeletal:         General: Normal range of motion.      Right lower leg: No edema.      Left lower leg: No edema.   Skin:     General: Skin is warm and dry.   Neurological:      General: No focal deficit present.      Mental Status: He is alert and oriented to person, place, and time.   Psychiatric:         Mood and Affect: Mood normal.         Behavior: Behavior normal.       Current Outpatient Medications   Medication Instructions    benztropine (COGENTIN) 1 mg, Oral, 2 times daily    busPIRone (BUSPAR) 15 mg, 3 times daily    carbidopa-levodopa  mg (SINEMET)  mg per tablet TWO TABLETS EVERY MORNING, TWO TABLETS AT NOON, ONE TABLET EVERY EVENING &  ONE TABLET AT 9pm    cetirizine (ZYRTEC)  10 MG tablet Take 1 tablet (10 mg total) by mouth once daily for 10 days, THEN 1 tablet (10 mg total) daily as needed for Allergies or Rhinitis.    FLUoxetine 20 MG capsule 1 capsule, Daily    fluticasone propionate (FLONASE) 50 mcg/actuation nasal spray 1 spray, 2 times daily    metoprolol succinate (TOPROL-XL) 25 mg, Oral, Daily    oxymetazoline (VICKS SINEX ULTRA FINE MIST 12) 0.05 % Mist   2 spray(s), PRN PRN congestion, 0 Refill(s)    pantoprazole (PROTONIX) 40 mg, Oral, Daily    rosuvastatin (CRESTOR) 10 mg, Oral, Daily        All medications, laboratory studies, cardiac diagnostic imaging reviewed.     Lab Results   Component Value Date    LDL 83.00 11/05/2024    LDL 92.00 01/18/2024    TRIG 77 11/05/2024    TRIG 85 01/18/2024    CREATININE 1.03 11/05/2024    K 4.1 11/05/2024        ASSESSMENT/PLAN:  Atrial fibrillation with rapid ventricular response  - No change since last office visit   - Appears to be in regular rate with regular rhythm on exam today   - Stable and asymptomatic- denies any palpitations, lightheadedness, or syncopal episodes   - Currently on metoprolol succinate 25 mg daily and tolerating well  - [id number] score 0 indicating a 0.2% stroke risk per year    Dizziness  - Stable from last visit - no progression or change in symptoms   - Reports ongoing dizziness and lightheadedness that typically occurs with quick changes in positions.  States that the symptoms usually occur when going from lying down or sitting to standing very quickly.  - Carotid ultrasound completed in September 2023 did not reveal any hemodynamically stenosis of bilateral internal carotid arteries.  Not suggestive NORBERT  - Discussed the importance of changing positions slowly, maintaining adequate hydration with water, and potentially compression socks for further vascular support in lower extremities    Mixed hyperlipidemia  - LDL 83 (11.5.24)  - On Crestor 10 MG daily   - Counseled on low-cholesterol, heart healthy diet  exercise as tolerated     GERD  - Management per PCP    Anxiety  - Management per PCP  - Patient states that he uses relaxation techniques to help him calm down, in addition to his medication    /80 today       Follow up in cardiology clinic in 6 months or sooner if needed   Follow up with PCP as directed  Please notify clinic of any new concerns or any changes symptoms

## 2025-01-29 ENCOUNTER — OFFICE VISIT (OUTPATIENT)
Dept: CARDIOLOGY | Facility: CLINIC | Age: 60
End: 2025-01-29
Payer: COMMERCIAL

## 2025-01-29 VITALS
DIASTOLIC BLOOD PRESSURE: 80 MMHG | HEIGHT: 71 IN | RESPIRATION RATE: 18 BRPM | TEMPERATURE: 98 F | BODY MASS INDEX: 32.75 KG/M2 | WEIGHT: 233.94 LBS | HEART RATE: 60 BPM | OXYGEN SATURATION: 96 % | SYSTOLIC BLOOD PRESSURE: 132 MMHG

## 2025-01-29 DIAGNOSIS — I48.91 ATRIAL FIBRILLATION WITH RAPID VENTRICULAR RESPONSE: Chronic | ICD-10-CM

## 2025-01-29 DIAGNOSIS — E78.2 MIXED HYPERLIPIDEMIA: Primary | Chronic | ICD-10-CM

## 2025-01-29 DIAGNOSIS — R42 DIZZINESS: Chronic | ICD-10-CM

## 2025-01-29 PROCEDURE — 1159F MED LIST DOCD IN RCRD: CPT | Mod: CPTII,,,

## 2025-01-29 PROCEDURE — 3079F DIAST BP 80-89 MM HG: CPT | Mod: CPTII,,,

## 2025-01-29 PROCEDURE — 3075F SYST BP GE 130 - 139MM HG: CPT | Mod: CPTII,,,

## 2025-01-29 PROCEDURE — 3008F BODY MASS INDEX DOCD: CPT | Mod: CPTII,,,

## 2025-01-29 PROCEDURE — 99215 OFFICE O/P EST HI 40 MIN: CPT | Mod: PBBFAC

## 2025-01-29 PROCEDURE — 99214 OFFICE O/P EST MOD 30 MIN: CPT | Mod: S$PBB,,,

## 2025-01-29 PROCEDURE — 1160F RVW MEDS BY RX/DR IN RCRD: CPT | Mod: CPTII,,,

## 2025-01-29 RX ORDER — ROSUVASTATIN CALCIUM 10 MG/1
10 TABLET, COATED ORAL DAILY
Qty: 90 TABLET | Refills: 3 | Status: SHIPPED | OUTPATIENT
Start: 2025-01-29 | End: 2026-01-29

## 2025-01-29 NOTE — PATIENT INSTRUCTIONS
Follow up in cardiology clinic in 6 months or sooner if needed   Follow up with PCP as directed  Please notify clinic of any new concerns or any changes symptoms

## 2025-04-15 ENCOUNTER — OFFICE VISIT (OUTPATIENT)
Dept: OPHTHALMOLOGY | Facility: CLINIC | Age: 60
End: 2025-04-15
Payer: COMMERCIAL

## 2025-04-15 VITALS — WEIGHT: 236 LBS | HEIGHT: 71 IN | BODY MASS INDEX: 33.04 KG/M2

## 2025-04-15 DIAGNOSIS — H04.123 DRY EYES, BILATERAL: ICD-10-CM

## 2025-04-15 DIAGNOSIS — H31.002 CHORIORETINAL SCAR, LEFT: ICD-10-CM

## 2025-04-15 DIAGNOSIS — G20.A1 PARKINSON'S DISEASE, UNSPECIFIED WHETHER DYSKINESIA PRESENT, UNSPECIFIED WHETHER MANIFESTATIONS FLUCTUATE: ICD-10-CM

## 2025-04-15 DIAGNOSIS — H02.823: Primary | ICD-10-CM

## 2025-04-15 DIAGNOSIS — D31.32 CHOROIDAL NEVUS OF LEFT EYE: ICD-10-CM

## 2025-04-15 DIAGNOSIS — H40.013 AT LOW RISK FOR OPEN-ANGLE GLAUCOMA IN BOTH EYES: ICD-10-CM

## 2025-04-15 DIAGNOSIS — H40.9 GLAUCOMA OF BOTH EYES, UNSPECIFIED GLAUCOMA TYPE: ICD-10-CM

## 2025-04-15 PROCEDURE — 92285 EXTERNAL OCULAR PHOTOGRAPHY: CPT | Mod: PBBFAC,PN

## 2025-04-15 PROCEDURE — 99213 OFFICE O/P EST LOW 20 MIN: CPT | Mod: PBBFAC,PN

## 2025-04-15 PROCEDURE — 92133 CPTRZD OPH DX IMG PST SGM ON: CPT | Mod: PBBFAC,PN

## 2025-04-15 PROCEDURE — 92133 CPTRZD OPH DX IMG PST SGM ON: CPT | Mod: PBBFAC,PN | Performed by: OPHTHALMOLOGY

## 2025-04-15 PROCEDURE — 92285 EXTERNAL OCULAR PHOTOGRAPHY: CPT | Mod: PBBFAC,PN | Performed by: OPHTHALMOLOGY

## 2025-04-15 RX ORDER — LATANOPROST 50 UG/ML
1 SOLUTION/ DROPS OPHTHALMIC NIGHTLY
Qty: 2.5 ML | Refills: 11 | Status: SHIPPED | OUTPATIENT
Start: 2025-04-15 | End: 2026-04-15

## 2025-04-15 NOTE — PROGRESS NOTES
HPI    Here for Yearly DFE OU.  - States bump on corner of OD has been flaring up lately.  - No visual complaints at present time.    Last edited by Kathy Vizcarra LPN on 4/15/2025  8:26 AM.            Assessment /Plan     For exam results, see Encounter Report.    Epidermal cyst of eyelid, right    Chorioretinal scar, left    Glaucoma of both eyes, unspecified glaucoma type  -     latanoprost (XALATAN) 0.005 % ophthalmic solution; Place 1 drop into both eyes every evening.  Dispense: 2.5 mL; Refill: 11    Parkinson's disease, unspecified whether dyskinesia present, unspecified whether manifestations fluctuate    Dry eyes, bilateral          4/15/24      4/15/25         Optos 4/15/24:  OD: media clear, nerve p/s, macula flat, periphery flat w/o h/t/d  OS: media clear, winters ring, nerve p/s, macula flat, periphery flat w/o h/t/d, large CRS IT with atrophic central hole      Fundus Photo 4/15/25  RE: Media clear, Optic nerve pink and sharp, blood vessels WNL, no retinal holes, tears, or detachments  LE: Media clear, Optic nerve pink and sharp, blood vessels WNL. Large inferotemporal retinal hole with surrounding hyperpigmentation. No evidence of fluid.    OCT Mac 4/15/25  RE: , NFC, no IRF/SRF  LE: , NFC, no IRF/SRF. PVD    Ganglion cell analysis 4/15/25  RE: all green  LE: 1 yellow segment, rest green    OCT RNFL 4/15/25  RE: 89 yellow I  LE: 77 red I    Parkinson Disease  Dry eye  - no lagophthalmos  - Recommended ATs 4 times/day (can try regular ATs, but switch to preservative free if this is causing irritation)  - Start warm compresses and lid scrubs BID and PRN - handout given with instructions  - If still symptomatic, start artificial tear ointment nightly      3. Right lateral canthal lesion  - Reports being there for years, feels it has gotten large over last several months  - Larger compared to photos taken in 2024, see photos above  - Can consider removing on procedure day once  glaucoma/IOP under control    4. Left CRS  - IT left eye. As of 2024, reports that 10+ years ago he had mild trauma to that eye and was told he had scar tissue  - Optos 4/15/24, 4/15/25 - stable  - monitor    5. Glaucoma OU, unspecified   - Family history: brother, possibly mother   - CCT: none  - Gonio: none  - Surgeries: none  - Lasers: none  - Tmax: 21//26  - Initial Ttarget: 15/18  - Drop intolerance: none  - Last OCT RNFL   4/15/25:  89//77; yellow I // red I  - Last HVF 24-2: pending  - Current regimen: Start latanoprost QHS OU 4/15/25  - RTC 6-8 weeks CCT, Gonio, HVF      RTC 6-8 weeks CCT, Gonio, HVF, IOP   Cosentyx Counseling:  I discussed with the patient the risks of Cosentyx including but not limited to worsening of Crohn's disease, immunosuppression, allergic reactions and infections.  The patient understands that monitoring is required including a PPD at baseline and must alert us or the primary physician if symptoms of infection or other concerning signs are noted.

## 2025-04-16 PROBLEM — H04.123 DRY EYES, BILATERAL: Status: ACTIVE | Noted: 2025-04-16

## 2025-05-05 ENCOUNTER — OFFICE VISIT (OUTPATIENT)
Dept: INTERNAL MEDICINE | Facility: CLINIC | Age: 60
End: 2025-05-05
Payer: COMMERCIAL

## 2025-05-05 VITALS
OXYGEN SATURATION: 98 % | SYSTOLIC BLOOD PRESSURE: 135 MMHG | DIASTOLIC BLOOD PRESSURE: 84 MMHG | HEART RATE: 63 BPM | WEIGHT: 236.38 LBS | RESPIRATION RATE: 16 BRPM | HEIGHT: 71 IN | BODY MASS INDEX: 33.09 KG/M2 | TEMPERATURE: 98 F

## 2025-05-05 DIAGNOSIS — K21.9 GASTROESOPHAGEAL REFLUX DISEASE WITHOUT ESOPHAGITIS: ICD-10-CM

## 2025-05-05 DIAGNOSIS — M62.838 MUSCLE SPASM: Primary | ICD-10-CM

## 2025-05-05 DIAGNOSIS — E78.2 MIXED HYPERLIPIDEMIA: Chronic | ICD-10-CM

## 2025-05-05 DIAGNOSIS — E66.09 CLASS 1 OBESITY DUE TO EXCESS CALORIES WITHOUT SERIOUS COMORBIDITY WITH BODY MASS INDEX (BMI) OF 32.0 TO 32.9 IN ADULT: ICD-10-CM

## 2025-05-05 DIAGNOSIS — Z23 IMMUNIZATION DUE: ICD-10-CM

## 2025-05-05 DIAGNOSIS — E66.811 CLASS 1 OBESITY DUE TO EXCESS CALORIES WITHOUT SERIOUS COMORBIDITY WITH BODY MASS INDEX (BMI) OF 32.0 TO 32.9 IN ADULT: ICD-10-CM

## 2025-05-05 DIAGNOSIS — G20.A1 PARKINSON'S DISEASE, UNSPECIFIED WHETHER DYSKINESIA PRESENT, UNSPECIFIED WHETHER MANIFESTATIONS FLUCTUATE: Chronic | ICD-10-CM

## 2025-05-05 DIAGNOSIS — J06.9 URI WITH COUGH AND CONGESTION: ICD-10-CM

## 2025-05-05 PROBLEM — K21.00 GASTROESOPHAGEAL REFLUX DISEASE WITH ESOPHAGITIS: Chronic | Status: RESOLVED | Noted: 2022-05-16 | Resolved: 2025-05-05

## 2025-05-05 PROCEDURE — 1159F MED LIST DOCD IN RCRD: CPT | Mod: CPTII,,,

## 2025-05-05 PROCEDURE — 99215 OFFICE O/P EST HI 40 MIN: CPT | Mod: PBBFAC

## 2025-05-05 PROCEDURE — G0009 ADMIN PNEUMOCOCCAL VACCINE: HCPCS | Mod: PBBFAC

## 2025-05-05 PROCEDURE — 3008F BODY MASS INDEX DOCD: CPT | Mod: CPTII,,,

## 2025-05-05 PROCEDURE — 90677 PCV20 VACCINE IM: CPT | Mod: PBBFAC

## 2025-05-05 PROCEDURE — 1160F RVW MEDS BY RX/DR IN RCRD: CPT | Mod: CPTII,,,

## 2025-05-05 PROCEDURE — 3075F SYST BP GE 130 - 139MM HG: CPT | Mod: CPTII,,,

## 2025-05-05 PROCEDURE — 3079F DIAST BP 80-89 MM HG: CPT | Mod: CPTII,,,

## 2025-05-05 PROCEDURE — 99214 OFFICE O/P EST MOD 30 MIN: CPT | Mod: S$PBB,,,

## 2025-05-05 RX ORDER — CETIRIZINE HYDROCHLORIDE 10 MG/1
10 TABLET ORAL DAILY PRN
Qty: 90 TABLET | Refills: 2 | Status: SHIPPED | OUTPATIENT
Start: 2025-05-05 | End: 2026-04-25

## 2025-05-05 RX ORDER — PANTOPRAZOLE SODIUM 40 MG/1
40 TABLET, DELAYED RELEASE ORAL DAILY
Qty: 90 TABLET | Refills: 2 | Status: SHIPPED | OUTPATIENT
Start: 2025-05-05 | End: 2026-05-05

## 2025-05-05 RX ADMIN — PNEUMOCOCCAL 20-VALENT CONJUGATE VACCINE 0.5 ML
2.2; 2.2; 2.2; 2.2; 2.2; 2.2; 2.2; 2.2; 2.2; 2.2; 2.2; 2.2; 2.2; 2.2; 2.2; 2.2; 4.4; 2.2; 2.2; 2.2 INJECTION, SUSPENSION INTRAMUSCULAR at 07:05

## 2025-05-05 NOTE — ASSESSMENT & PLAN NOTE
Body mass index is 32.97 kg/m².  Goal BMI <30.  Aerobic exercise 150 minutes per week.  Avoid soda, simple sugars, sweets, excessive rice, pasta, potatoes or bread.   Choose brown options when available and portion control.  Limit fast foods and fried foods.   Choose complex carbs in moderation (ex: green, leafy vegetables, beans, oatmeal).  Eat plenty of fresh fruits and vegetables with lean meats daily.   Consider permanent healthy lifestyle changes.

## 2025-05-05 NOTE — PATIENT INSTRUCTIONS
Khari Cobb,     If you are due for any health screening(s) below please notify me so we can arrange them to be ordered and scheduled. Most healthy patients at your age complete them, but you are free to accept or refuse.     If you can't do it, I'll definitely understand. If you can, I'd certainly appreciate it!    All of your core healthy metrics are met.

## 2025-05-05 NOTE — ASSESSMENT & PLAN NOTE
Patient reports pain and swelling in both legs, especially when pressing on the muscle.  Recommend ibuprofen 600 mg (3 tablets of 200 mg each) as needed - purchase OTC.  Explained that ibuprofen has anti-inflammatory effects, making it suitable for muscle pain.  Advised taking it before bed to help with morning muscle pain.

## 2025-05-05 NOTE — ASSESSMENT & PLAN NOTE
Patient followed by [facility] Neurology.  Current tx: Benztropine 1 mg BID (10/5/2022 to present)  Sinemet 25mg - 100mg QID (5/6/2021 - present) - 2 tab at 8 AM - 2 tab at 12 PM - 1 tab at 4PM - 1 tab at 9:30 PM.

## 2025-05-05 NOTE — ASSESSMENT & PLAN NOTE
Lab Results   Component Value Date    LDL 91.00 05/05/2025       Lab Results   Component Value Date    TRIG 88 05/05/2025       Lab Results   Component Value Date    HDL 37 05/05/2025        Lab Results   Component Value Date    CHOL 146 05/05/2025      Continue rosuvastatin as prescribed.  Follow a low cholesterol, low saturated fat diet with less than 200 mg of cholesterol a day.   Avoid fried foods and high saturated fats.  Add flax seed or fish oil supplements to diet.   Increase dietary fiber.   Regular exercise improves cholesterol levels.  Physical activity 5 times a week for 30 minutes per day (or 150 minutes per week).   Stressed importance of dietary modifications.

## 2025-05-05 NOTE — PROGRESS NOTES
PATIENT NAME: Reza Wellington  : 1965  DATE: 25  MRN: [id number]          Reason for Visit/Chief Complaint   Results, Medication Refill, and Spasms       History of Present Illness (HPI)     Reza Wellington is a 59 y.o. Black male presenting in clinic today for Results, Medication Refill, and Spasms. PMH: A-Fib, Parkinson's Disease, Anxiety/Depression, Chronic Neck/Lumbar Pain, Obesity, Hx of Umbilical Hernia. Sister present during visit.     Patient is managed by:  [facility] cardiology clinic for Afib. Last office visit on 2025.  CV US BL Doppler Carotid 9.8.23  The right internal carotid artery demonstrated no hemodynamically significant stenosis.  There is no substantial right side carotid plaque identified.  The right vertebral artery is patent with antegrade flow.  The left internal carotid artery demonstrated no hemodynamically significant stenosis.  There is no substantial left side carotid plaque identified.  The left vertebral artery is patent with antegrade flow.      EKG 7.27.23  NSR. Vent rate 64 BPM.      Exercise Stress Test 9.8.21  The patient exercised according to the KAILA for 6:00 min:s, achieving a work level of Max. METS: 7.00.  The resting heart rate of 71 bpm ev to a maximal heart rate of 146 bpm. This value represents 89 % of the  maximal, age-predicted heart rate. The resting blood pressure of 137/112 mmHg , ev to a maximum blood  pressure of 157/99 mmHg. The exercise test was stopped due to Target heart rate achieved.  Interpretation  Summary: Resting ECG: normal.  Chest Pain: none.     [facility] neurology clinic for tremor disorder. Last office visit on 12/3/2024. Benztropine 1 mg BID (10/5/2022 to present)  Sinemet 25mg - 100mg QID (2021 - present) - 2 tab at 8 AM - 2 tab at 12 PM - 1 tab at 4PM - 1 tab at 9:30 PM.      [facility] ophthalmology clinic for right epidermal cyst. Last office visit on 4/15/2025.     Patient presents today for follow up. He reports bilateral  leg pain elicited upon palpation, possibly associated with recent physical activity involving lifting and carrying buckets of dirt at [employer]. His previous upper respiratory congestion has completely resolved. He takes Metoprolol managed by cardiology, Pantoprazole for acid reflux, and daily allergy medication. He received the pneumonia vaccine today.    Denies smoking, drinking, or illicit drug use. Denies chest pain, shortness of breath, cough, headache, dizziness, weakness, abdominal pain, nausea, vomiting, diarrhea, constipation, dysuria, depression, anxiety, SI, and HI.     Prostate Cancer Screening - Last PSA - 0.36 on 6/30/2021. Follow up annually. (PSA ordered).  Colon Cancer Screening - Cologuard negative on 8/5/2022. Repeat in 3 years.  Eye Exam -Several years. List of local eye doctors given to patient.   Dental Exam - Several years. List of local dentists given to patient.   Vaccinations: Flu - 11/5/2024 / Shingles - Declines / Tetanus - 6/29/2024     **Addendum: completed labs after visit - result management.    Review of Systems     Review of Systems   Constitutional: Negative.    HENT: Negative.     Eyes: Negative.    Respiratory: Negative.     Cardiovascular: Negative.    Gastrointestinal: Negative.    Endocrine: Negative.    Genitourinary: Negative.    Musculoskeletal:  Positive for myalgias.   Skin: Negative.    Allergic/Immunologic: Negative.    Neurological: Negative.    Hematological: Negative.    Psychiatric/Behavioral: Negative.     All other systems reviewed and are negative.      Medical / Social / Family History     Past Medical History:   Diagnosis Date    Anxiety     Atrial fibrillation     Chronic cervical pain     Chronic lumbar pain     Depression     Obesity, unspecified     Parkinson's disease     Umbilical hernia          Past Surgical History:   Procedure Laterality Date    HERNIA REPAIR      KNEE SURGERY           Social History  Reza Wellington's  reports that he has never  "smoked. He has never been exposed to tobacco smoke. He has never used smokeless tobacco. He reports that he does not currently use alcohol after a past usage of about 1.0 standard drink of alcohol per week. He reports that he does not use drugs.    Family History  Reza Wellington's family history includes Arthritis in his mother; Cancer in his maternal aunt; Depression in his paternal aunt; Diabetes in his mother; Hypertension in his paternal aunt; Kidney disease in his mother; Learning disabilities in his daughter.    Medications and Allergies     Medications  Current Outpatient Medications   Medication Instructions    benztropine (COGENTIN) 1 mg, Oral, 2 times daily    busPIRone (BUSPAR) 15 mg, 3 times daily    carbidopa-levodopa  mg (SINEMET)  mg per tablet TWO TABLETS EVERY MORNING, TWO TABLETS AT NOON, ONE TABLET EVERY EVENING &  ONE TABLET AT 9pm    cetirizine (ZYRTEC) 10 mg, Oral, Daily PRN    FLUoxetine 20 MG capsule 1 capsule, Daily    fluticasone propionate (FLONASE) 50 mcg/actuation nasal spray 1 spray, 2 times daily    latanoprost (XALATAN) 0.005 % ophthalmic solution 1 drop, Both Eyes, Nightly    metoprolol succinate (TOPROL-XL) 25 mg, Oral, Daily    oxymetazoline (VICKS SINEX ULTRA FINE MIST 12) 0.05 % Mist   2 spray(s), PRN PRN congestion, 0 Refill(s)    pantoprazole (PROTONIX) 40 mg, Oral, Daily    rosuvastatin (CRESTOR) 10 mg, Oral, Daily       Allergies  Review of patient's allergies indicates:  No Known Allergies    Physical Examination   Visit Vitals  /84 (BP Location: Left arm, Patient Position: Sitting)   Pulse 63   Temp 97.5 °F (36.4 °C) (Oral)   Resp 16   Ht 5' 11" (1.803 m)   Wt 107.2 kg (236 lb 6.4 oz)   SpO2 98%   BMI 32.97 kg/m²     Physical Exam  Vitals reviewed.   Constitutional:       Appearance: Normal appearance. He is obese.   HENT:      Head: Normocephalic.      Nose: Nose normal.      Mouth/Throat:      Mouth: Mucous membranes are moist.      Pharynx: " Oropharynx is clear.   Eyes:      Extraocular Movements: Extraocular movements intact.      Conjunctiva/sclera: Conjunctivae normal.      Pupils: Pupils are equal, round, and reactive to light.   Cardiovascular:      Rate and Rhythm: Normal rate and regular rhythm.      Heart sounds: Normal heart sounds.   Pulmonary:      Effort: Pulmonary effort is normal.      Breath sounds: Normal breath sounds.   Abdominal:      General: Abdomen is flat. Bowel sounds are normal.      Palpations: Abdomen is soft.   Musculoskeletal:         General: Normal range of motion.      Cervical back: Normal range of motion.        Legs:    Skin:     General: Skin is warm and dry.      Capillary Refill: Capillary refill takes less than 2 seconds.   Neurological:      General: No focal deficit present.      Mental Status: He is alert and oriented to person, place, and time.   Psychiatric:         Mood and Affect: Mood normal.           Results     Lab Results   Component Value Date    WBC 5.69 05/05/2025    RBC 5.25 05/05/2025    HGB 16.0 05/05/2025    HCT 48.4 05/05/2025    MCV 92.2 05/05/2025    MCH 30.5 05/05/2025    MCHC 33.1 05/05/2025    RDW 12.5 05/05/2025     05/05/2025    MPV 11.1 (H) 05/05/2025      Lab Results   Component Value Date     05/05/2025    K 4.2 05/05/2025    CO2 29 05/05/2025    BUN 28.4 (H) 05/05/2025    CREATININE 1.24 05/05/2025    ALBUMIN 4.0 05/05/2025    BILITOT 1.1 05/05/2025    ALKPHOS 84 05/05/2025    AST 14 05/05/2025    ALT 6 05/05/2025    AGAP 7.0 05/05/2025    EGFRNORACEVR >60 05/05/2025     Lab Results   Component Value Date    TSH 0.948 01/18/2024     Lab Results   Component Value Date    CHOL 146 05/05/2025    HDL 37 05/05/2025    LDL 91.00 05/05/2025    TRIG 88 05/05/2025     Lab Results   Component Value Date    SGUA 1.029 05/05/2025    PROTEINUA Negative 05/05/2025    BILIRUBINUA Negative 05/05/2025    WBCUA 0-5 05/05/2025    RBCUA 0-5 05/05/2025    BACTERIA None Seen 05/05/2025     "LEUKOCYTESUR Negative 05/05/2025    UROBILINOGEN 1+ (A) 05/05/2025     Lab Results   Component Value Date    CREATRANDUR 280.6 (H) 05/05/2025    MICALBCREAT 4.5 05/05/2025     Lab Results   Component Value Date    WNNANOZU59GL 39 05/05/2025    FOLATE 3.9 (L) 01/13/2023     Lab Results   Component Value Date    HIV Nonreactive 01/18/2024    HEPCAB Nonreactive 01/18/2024     Lab Results   Component Value Date    COLOGUARD Negative 08/08/2022     No results found for: "OCCBLDIA"    Assessment and Plan (including Health Maintenance)     Problem List Items Addressed This Visit       Parkinson's disease (Chronic)    Current Assessment & Plan   Patient followed by [facility] Neurology.  Current tx: Benztropine 1 mg BID (10/5/2022 to present)  Sinemet 25mg - 100mg QID (5/6/2021 - present) - 2 tab at 8 AM - 2 tab at 12 PM - 1 tab at 4PM - 1 tab at 9:30 PM.          Class 1 obesity due to excess calories without serious comorbidity with body mass index (BMI) of 32.0 to 32.9 in adult    Current Assessment & Plan   Body mass index is 32.97 kg/m².  Goal BMI <30.  Aerobic exercise 150 minutes per week.  Avoid soda, simple sugars, sweets, excessive rice, pasta, potatoes or bread.   Choose brown options when available and portion control.  Limit fast foods and fried foods.   Choose complex carbs in moderation (ex: green, leafy vegetables, beans, oatmeal).  Eat plenty of fresh fruits and vegetables with lean meats daily.   Consider permanent healthy lifestyle changes.          Relevant Orders    Comprehensive Metabolic Panel    CBC Auto Differential    Urinalysis    Muscle spasm - Primary    Current Assessment & Plan   Patient reports pain and swelling in both legs, especially when pressing on the muscle.  Recommend ibuprofen 600 mg (3 tablets of 200 mg each) as needed - purchase OTC.  Explained that ibuprofen has anti-inflammatory effects, making it suitable for muscle pain.  Advised taking it before bed to help with morning muscle " pain.         Hyperlipemia (Chronic)    Current Assessment & Plan   Lab Results   Component Value Date    LDL 91.00 05/05/2025       Lab Results   Component Value Date    TRIG 88 05/05/2025       Lab Results   Component Value Date    HDL 37 05/05/2025        Lab Results   Component Value Date    CHOL 146 05/05/2025      Continue rosuvastatin as prescribed.  Follow a low cholesterol, low saturated fat diet with less than 200 mg of cholesterol a day.   Avoid fried foods and high saturated fats.  Add flax seed or fish oil supplements to diet.   Increase dietary fiber.   Regular exercise improves cholesterol levels.  Physical activity 5 times a week for 30 minutes per day (or 150 minutes per week).   Stressed importance of dietary modifications.          URI with cough and congestion    Current Assessment & Plan   Resolved.  Ok to continue cetirizine and fluticasone PRN allergic rhinitis.         Relevant Medications    cetirizine (ZYRTEC) 10 MG tablet    Gastroesophageal reflux disease without esophagitis    Current Assessment & Plan   Avoid spicy, acidic, fried food and alcohol.    Eat 2-3 hours before bed.   Avoid tight fitting clothes and chew food thoroughly.    Reduce caffeine intake, avoid soda.    Take pantoprazole as prescribed - refilled today.            Relevant Medications    pantoprazole (PROTONIX) 40 MG tablet    Immunization due    Current Assessment & Plan   Pneumococcal vaccine was administered.  Ok to take acetaminophen for soreness of arm.          Relevant Medications    pneumoc 20-shawn conj-dip cr(PF) (PREVNAR-20 (PF)) injection Syrg 0.5 mL (Completed)         Health Maintenance Due   Topic Date Due    RSV Vaccine (Age 60+ and Pregnant patients) (1 - Risk 60-74 years 1-dose series) Never done    COVID-19 Vaccine (5 - 2024-25 season) 09/01/2024     All of your core healthy metrics are met.      Health Maintenance Topics with due status: Not Due       Topic Last Completion Date    TETANUS VACCINE  06/29/2021    Colorectal Cancer Screening 08/08/2022    PROSTATE-SPECIFIC ANTIGEN 11/05/2024    Hemoglobin A1c (Diabetic Prevention Screening) 11/05/2024    Lipid Panel 05/05/2025       Future Appointments   Date Time Provider Department Center   6/17/2025  2:00 PM PROVIDERS, [facility] [place]   7/15/2025  8:45 AM Francoise Barrera MD [facility] NEURO [place]   7/29/2025  9:45 AM Liliana Ortega PA-C [facility] CARD [place] [facility]   8/6/2025  2:20 PM Jenni Shukla FNP [facility] INTMED [facility]        Follow up in about 3 months (around 8/6/2025) for F2F, Medicare Wellness, No labs due, Med check, RTC PRN.          Signature:        ALESSANDRO Huang  [facility]  [address] [id number]    Date of encounter: 5/5/25    This note was generated with the assistance of ambient listening technology. Verbal consent was obtained by the patient and accompanying visitor(s) for the recording of patient appointment to facilitate this note. I attest to having reviewed and edited the generated note for accuracy, though some syntax or spelling errors may persist. Please contact the author of this note for any clarification.

## 2025-05-08 DIAGNOSIS — G20.A1 PARKINSON'S DISEASE WITHOUT DYSKINESIA OR FLUCTUATING MANIFESTATIONS: ICD-10-CM

## 2025-05-09 ENCOUNTER — RESULTS FOLLOW-UP (OUTPATIENT)
Dept: INTERNAL MEDICINE | Facility: CLINIC | Age: 60
End: 2025-05-09

## 2025-05-09 DIAGNOSIS — R81 GLYCOSURIA: Primary | ICD-10-CM

## 2025-05-09 RX ORDER — BENZTROPINE MESYLATE 1 MG/1
1 TABLET ORAL 2 TIMES DAILY
Qty: 180 TABLET | Refills: 3 | Status: SHIPPED | OUTPATIENT
Start: 2025-05-09

## 2025-05-09 NOTE — PROGRESS NOTES
Please inform Reza Wellington of the following:    Labs are unremarkable except for glucose (sugar) in urine. He is not diabetic, so I will order a kidney ultrasound to evaluate the kidneys to determine if anything is structurally the cause.    For any questions, please let me know.  Thank you,    ALEK Gamino

## 2025-05-12 ENCOUNTER — TELEPHONE (OUTPATIENT)
Dept: INTERNAL MEDICINE | Facility: CLINIC | Age: 60
End: 2025-05-12
Payer: COMMERCIAL

## 2025-05-12 NOTE — TELEPHONE ENCOUNTER
----- Message from ALESSANDRO Diaz sent at 5/9/2025  4:52 PM CDT -----  Please inform Reza Wellington of the following:    Labs are unremarkable except for glucose (sugar) in urine. He is not diabetic, so I will order a kidney ultrasound to evaluate the kidneys to determine if anything is structurally the cause.    For any questions, please let me know.  Thank you,    ALEK Gamino   ----- Message -----  From: Lab, Background User  Sent: 5/5/2025   8:55 AM CDT  To: ALESSANDRO Huang

## 2025-05-13 ENCOUNTER — TELEPHONE (OUTPATIENT)
Dept: INTERNAL MEDICINE | Facility: CLINIC | Age: 60
End: 2025-05-13
Payer: COMMERCIAL

## 2025-05-14 ENCOUNTER — TELEPHONE (OUTPATIENT)
Dept: INTERNAL MEDICINE | Facility: CLINIC | Age: 60
End: 2025-05-14
Payer: COMMERCIAL

## 2025-05-21 ENCOUNTER — HOSPITAL ENCOUNTER (OUTPATIENT)
Dept: RADIOLOGY | Facility: HOSPITAL | Age: 60
Discharge: HOME | End: 2025-05-21
Payer: COMMERCIAL

## 2025-05-21 DIAGNOSIS — R81 GLYCOSURIA: ICD-10-CM

## 2025-05-21 PROCEDURE — 76770 US EXAM ABDO BACK WALL COMP: CPT | Mod: TC

## 2025-05-22 ENCOUNTER — RESULTS FOLLOW-UP (OUTPATIENT)
Dept: INTERNAL MEDICINE | Facility: CLINIC | Age: 60
End: 2025-05-22

## 2025-06-17 ENCOUNTER — OFFICE VISIT (OUTPATIENT)
Dept: OPHTHALMOLOGY | Facility: CLINIC | Age: 60
End: 2025-06-17
Payer: COMMERCIAL

## 2025-06-17 VITALS — HEIGHT: 71 IN | BODY MASS INDEX: 33.08 KG/M2 | WEIGHT: 236.31 LBS

## 2025-06-17 DIAGNOSIS — H40.1134 PRIMARY OPEN ANGLE GLAUCOMA (POAG) OF BOTH EYES, INDETERMINATE STAGE: Primary | ICD-10-CM

## 2025-06-17 PROCEDURE — 76514 ECHO EXAM OF EYE THICKNESS: CPT | Mod: PBBFAC,PN

## 2025-06-17 PROCEDURE — 92020 GONIOSCOPY: CPT | Mod: PBBFAC,PN

## 2025-06-17 PROCEDURE — 99213 OFFICE O/P EST LOW 20 MIN: CPT | Mod: PBBFAC,PN

## 2025-06-17 NOTE — PROGRESS NOTES
HPI     CCT, Gonio, HVF, IOP  Last edited by Tosha Guzman MA on 6/17/2025  2:03 PM.            Assessment /Plan     For exam results, see Encounter Report.    Primary open angle glaucoma (POAG) of both eyes, indeterminate stage            4/15/24      4/15/25         Optos 4/15/24:  OD: media clear, nerve p/s, macula flat, periphery flat w/o h/t/d  OS: media clear, winters ring, nerve p/s, macula flat, periphery flat w/o h/t/d, large CRS IT with atrophic central hole      Fundus Photo 4/15/25  RE: Media clear, Optic nerve pink and sharp, blood vessels WNL, no retinal holes, tears, or detachments  LE: Media clear, Optic nerve pink and sharp, blood vessels WNL. Large inferotemporal retinal hole with surrounding hyperpigmentation. No evidence of fluid.    OCT Mac 4/15/25  RE: , NFC, no IRF/SRF  LE: , NFC, no IRF/SRF. PVD    Ganglion cell analysis 4/15/25  RE: all green  LE: 1 yellow segment, rest green    OCT RNFL 4/15/25  RE: 89 yellow I  LE: 77 red I    1. POAG, stage indeterminate OU  - Family history: brother, possibly mother   - CCT: 570//573  - Gonio: open to  OU  - Surgeries: none  - Lasers: none  - Tmax: 21//26  - Initial Ttarget: 15/18  - Drop intolerance: none  - Last OCT RNFL   4/15/25:  89//77; yellow I // red I  - Last HVF 24-2: pending  - Current regimen: Start latanoprost QHS OU 4/15/25  - 6/17/25: HVF machine down, IOP 18//20 improved, however patient only using latanoprost for 1 week consistently, was out of town earlier and forgot to bring his drops. RTC 6 weeks for IOP check, if controlled can schedule for right lateral canthal lesion removal    Not directly addressed:   Parkinson Disease  Dry eye  - no lagophthalmos  - Recommended ATs 4 times/day (can try regular ATs, but switch to preservative free if this is causing irritation)  - Start warm compresses and lid scrubs BID and PRN - handout given with instructions  - If still symptomatic, start artificial tear ointment  nightly    3. Right lateral canthal lesion  - Reports being there for years, feels it has gotten large over last several months  - Larger compared to photos taken in 2024, see photos above  - Can consider removing on procedure day once glaucoma/IOP under control    4. Left CRS  - IT left eye. As of 2024, reports that 10+ years ago he had mild trauma to that eye and was told he had scar tissue  - Optos 4/15/24, 4/15/25 - stable  - monitor    RTC 6 weeks HVF 24-2 NORTH day, then clinic visit IOP check
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
## 2025-07-15 ENCOUNTER — OFFICE VISIT (OUTPATIENT)
Dept: NEUROLOGY | Facility: CLINIC | Age: 60
End: 2025-07-15
Payer: COMMERCIAL

## 2025-07-15 VITALS
HEIGHT: 71 IN | RESPIRATION RATE: 18 BRPM | BODY MASS INDEX: 33.13 KG/M2 | WEIGHT: 236.63 LBS | HEART RATE: 91 BPM | OXYGEN SATURATION: 98 % | TEMPERATURE: 98 F | DIASTOLIC BLOOD PRESSURE: 74 MMHG | SYSTOLIC BLOOD PRESSURE: 132 MMHG

## 2025-07-15 DIAGNOSIS — G20.A1 PARKINSON'S DISEASE WITHOUT DYSKINESIA OR FLUCTUATING MANIFESTATIONS: Primary | ICD-10-CM

## 2025-07-15 PROCEDURE — 3008F BODY MASS INDEX DOCD: CPT | Mod: CPTII,,, | Performed by: PSYCHIATRY & NEUROLOGY

## 2025-07-15 PROCEDURE — 99214 OFFICE O/P EST MOD 30 MIN: CPT | Mod: PBBFAC | Performed by: PSYCHIATRY & NEUROLOGY

## 2025-07-15 PROCEDURE — G2211 COMPLEX E/M VISIT ADD ON: HCPCS | Mod: ,,, | Performed by: PSYCHIATRY & NEUROLOGY

## 2025-07-15 PROCEDURE — 3078F DIAST BP <80 MM HG: CPT | Mod: CPTII,,, | Performed by: PSYCHIATRY & NEUROLOGY

## 2025-07-15 PROCEDURE — 1159F MED LIST DOCD IN RCRD: CPT | Mod: CPTII,,, | Performed by: PSYCHIATRY & NEUROLOGY

## 2025-07-15 PROCEDURE — 99214 OFFICE O/P EST MOD 30 MIN: CPT | Mod: S$PBB,,, | Performed by: PSYCHIATRY & NEUROLOGY

## 2025-07-15 PROCEDURE — 3075F SYST BP GE 130 - 139MM HG: CPT | Mod: CPTII,,, | Performed by: PSYCHIATRY & NEUROLOGY

## 2025-07-15 RX ORDER — BENZTROPINE MESYLATE 1 MG/1
1 TABLET ORAL DAILY
Qty: 90 TABLET | Refills: 1 | Status: SHIPPED | OUTPATIENT
Start: 2025-07-15 | End: 2026-01-11

## 2025-07-15 RX ORDER — CARBIDOPA AND LEVODOPA 25; 100 MG/1; MG/1
TABLET ORAL
Qty: 540 TABLET | Refills: 4 | Status: SHIPPED | OUTPATIENT
Start: 2025-07-15

## 2025-07-23 ENCOUNTER — TELEPHONE (OUTPATIENT)
Dept: INTERNAL MEDICINE | Facility: CLINIC | Age: 60
End: 2025-07-23
Payer: COMMERCIAL

## 2025-07-23 NOTE — TELEPHONE ENCOUNTER
Copied from CRM #[id number]. Topic: General Inquiry - Patient Advice  >> Jul 22, 2025  3:54 PM Jennifer wrote:  .Who Called: Stephanie pt daughter     Caller is requesting assistance/information from provider's office.    Symptoms (please be specific): calling for the colon recatal test kit  and h  How long has patient had these symptoms:  n/a   List of preferred pharmacies on file (remove unneeded): [unfilled]  If different, enter pharmacy into here including location and phone number: n/a       Preferred Method of Contact: Phone Call  Patient's Preferred Phone Number on File: [phone]  Best Call Back Number, if different:  Additional Information:

## 2025-07-29 ENCOUNTER — OFFICE VISIT (OUTPATIENT)
Dept: CARDIOLOGY | Facility: CLINIC | Age: 60
End: 2025-07-29
Payer: COMMERCIAL

## 2025-07-29 VITALS
DIASTOLIC BLOOD PRESSURE: 80 MMHG | BODY MASS INDEX: 32.59 KG/M2 | WEIGHT: 232.81 LBS | HEART RATE: 84 BPM | OXYGEN SATURATION: 99 % | HEIGHT: 71 IN | TEMPERATURE: 98 F | SYSTOLIC BLOOD PRESSURE: 120 MMHG | RESPIRATION RATE: 18 BRPM

## 2025-07-29 DIAGNOSIS — I83.813 VARICOSE VEINS OF BOTH LOWER EXTREMITIES WITH PAIN: ICD-10-CM

## 2025-07-29 DIAGNOSIS — I48.91 ATRIAL FIBRILLATION WITH RAPID VENTRICULAR RESPONSE: Chronic | ICD-10-CM

## 2025-07-29 DIAGNOSIS — R60.0 BILATERAL LOWER EXTREMITY EDEMA: ICD-10-CM

## 2025-07-29 DIAGNOSIS — E78.2 MIXED HYPERLIPIDEMIA: Primary | Chronic | ICD-10-CM

## 2025-07-29 LAB
OHS QRS DURATION: 86 MS
OHS QTC CALCULATION: 451 MS

## 2025-07-29 PROCEDURE — 99215 OFFICE O/P EST HI 40 MIN: CPT | Mod: PBBFAC

## 2025-07-29 PROCEDURE — 1159F MED LIST DOCD IN RCRD: CPT | Mod: CPTII,,,

## 2025-07-29 PROCEDURE — 3008F BODY MASS INDEX DOCD: CPT | Mod: CPTII,,,

## 2025-07-29 PROCEDURE — 99214 OFFICE O/P EST MOD 30 MIN: CPT | Mod: S$PBB,,,

## 2025-07-29 PROCEDURE — 3074F SYST BP LT 130 MM HG: CPT | Mod: CPTII,,,

## 2025-07-29 PROCEDURE — 93005 ELECTROCARDIOGRAM TRACING: CPT

## 2025-07-29 PROCEDURE — 3079F DIAST BP 80-89 MM HG: CPT | Mod: CPTII,,,

## 2025-07-29 NOTE — PATIENT INSTRUCTIONS
Complete venous insufficiency ultrasounds   Follow up in cardiology clinic in 6 months or sooner if needed  Follow up with PCP as directed   Please notify clinic if any new concerns or any change in symptoms

## 2025-07-29 NOTE — PROGRESS NOTES
CHIEF COMPLAINT:   Chief Complaint   Patient presents with    Follow-up     6 mos f/u pt states he has trouble urinating after taking metoprolol                                                  HPI:  Reza Wellington 59 y.o. male past medical history of AFib, Parkinson's, obesity, GERD and chronic neck and back pain who presents to cardiology clinic for follow up and ongoing care. Carotid ultrasound previously ordered for some orthostatic lightheadedness and dizziness, revealed no significant stenosis in bilateral internal carotid arteries.  See full report below.  At last office visit patient stated that he felt well from a cardiac standpoint.     He continues to feel well.  He denies any new cardiac issues today.  He has occasional SOB/KELLY that only occurs with higher levels of activity.  He has occasional lightheadedness and dizziness that occur with quick positional changes or movements.  He denies any near-syncope or actual syncopal episodes.  He has occasional palpitations that he associates with anxiety.  Typically when he is less anxious or on a low stress environment he does not have these symptoms.  He denies any chest pain, PND, orthopnea, syncope, near-syncope, or claudication symptoms.  He does have some bilateral lower extremity swelling and tortuous varicose veins.  He states that he works on his feet for nearly 30 years.  We will order some updated venous ultrasounds to assess.  He states that he is somewhat active in his daily life and tries to walk for exercise.  He continues to follow up closely with Ophthalmology, Neurology, and PCP.  He reports compliance with all his current medications and is tolerating them well for the most part.  He states that he has recently noticed some difficulty urinating after taking metoprolol.  He denies any tobacco or other illicit drug use.                                                                                                                                                                                                                                                                                                                                                                                                                                                                    CARDIAC TESTING:    CV US BL Doppler Carotid 9.8.23  The right internal carotid artery demonstrated no hemodynamically significant stenosis.  There is no substantial right side carotid plaque identified.  The right vertebral artery is patent with antegrade flow.  The left internal carotid artery demonstrated no hemodynamically significant stenosis.  There is no substantial left side carotid plaque identified.  The left vertebral artery is patent with antegrade flow.      EKG 7.27.23  NSR. Vent rate 64 BPM.     Exercise Stress Test 9.8.21  The patient exercised according to the KAILA for 6:00 min:s, achieving a work level of Max. METS: 7.00.  The resting heart rate of 71 bpm ev to a maximal heart rate of 146 bpm. This value represents 89 % of the  maximal, age-predicted heart rate. The resting blood pressure of 137/112 mmHg , ev to a maximum blood  pressure of 157/99 mmHg. The exercise test was stopped due to Target heart rate achieved.  Interpretation  Summary: Resting ECG: normal.  Chest Pain: none.    Patient Active Problem List   Diagnosis    Umbilical hernia    Tremor    Parkinson's disease    Class 1 obesity due to excess calories without serious comorbidity with body mass index (BMI) of 32.0 to 32.9 in adult    Depression with anxiety    Chronic neck pain    Chronic low back pain    Atrial fibrillation with rapid ventricular response    Arthralgia of hip    Dizziness    Muscle spasm    Functional tremor    Hyperlipemia    Encounter to establish care    URI with cough and congestion    Vitamin D deficiency    Screening for malignant neoplasm of prostate    Gastroesophageal reflux disease  without esophagitis    Immunization due    Dry eyes, bilateral     Past Surgical History:   Procedure Laterality Date    HERNIA REPAIR      KNEE SURGERY       Social History     Socioeconomic History    Marital status: Single   Tobacco Use    Smoking status: Never     Passive exposure: Never    Smokeless tobacco: Never   Substance and Sexual Activity    Alcohol use: Not Currently    Drug use: Never    Sexual activity: Not Currently     Social Drivers of Health     Financial Resource Strain: High Risk (1/17/2024)    Overall Financial Resource Strain (CARDIA)     Difficulty of Paying Living Expenses: Hard   Food Insecurity: Food Insecurity Present (1/17/2024)    Hunger Vital Sign     Worried About Running Out of Food in the Last Year: Sometimes true     Ran Out of Food in the Last Year: Sometimes true   Transportation Needs: Unmet Transportation Needs (1/17/2024)    PRAPARE - Transportation     Lack of Transportation (Medical): No     Lack of Transportation (Non-Medical): Yes   Physical Activity: Unknown (1/17/2024)    Exercise Vital Sign     Days of Exercise per Week: 1 day   Stress: Stress Concern Present (1/17/2024)    [employer] [place] [employer] - Occupational Stress Questionnaire     Feeling of Stress : To some extent   Housing Stability: High Risk (1/17/2024)    Housing Stability Vital Sign     Unable to Pay for Housing in the Last Year: Yes     Unstable Housing in the Last Year: No        Family History   Problem Relation Name Age of Onset    Kidney disease Mother Chris Amish     Arthritis Mother Chris Amish     Diabetes Mother Chris Amish     Cancer Maternal Aunt Hiral Tijerina     Depression Paternal Aunt Harrison Thibiwilfredo     Hypertension Paternal Aunt Sarah Esposito     Learning disabilities Daughter Frank Wellington      Review of patient's allergies indicates:  No Known Allergies      ROS:  Review of Systems   Constitutional: Negative.  Negative for malaise/fatigue.   HENT: Negative.    "  Eyes: Negative.    Respiratory: Negative.  Negative for shortness of breath.    Cardiovascular: Negative.  Negative for chest pain, palpitations, orthopnea, claudication, leg swelling and PND.   Gastrointestinal: Negative.    Genitourinary: Negative.         Difficulty urinating on occasion   Musculoskeletal: Negative.  Negative for back pain and joint pain.   Skin: Negative.    Neurological:  Positive for dizziness.   Endo/Heme/Allergies: Negative.    Psychiatric/Behavioral: Negative.     All other systems reviewed and are negative.                                                                                                                                                                               Negative except as stated in the history of present illness. See HPI for details.    PHYSICAL EXAM:  Visit Vitals  BP (!) 144/95 (BP Location: Right arm, Patient Position: Sitting)   Pulse 84   Temp 98.3 °F (36.8 °C) (Oral)   Resp 18   Ht 5' 11" (1.803 m)   Wt 105.6 kg (232 lb 12.8 oz)   SpO2 99%   BMI 32.47 kg/m²           Physical Exam  Vitals reviewed.   Constitutional:       Appearance: Normal appearance. He is obese. He is not ill-appearing.   HENT:      Head: Normocephalic.      Nose: Nose normal.      Mouth/Throat:      Mouth: Mucous membranes are moist.   Eyes:      Pupils: Pupils are equal, round, and reactive to light.   Neck:      Vascular: No carotid bruit.   Cardiovascular:      Rate and Rhythm: Normal rate. Rhythm irregular.      Pulses: Normal pulses.      Heart sounds: Normal heart sounds. No murmur heard.  Pulmonary:      Effort: Pulmonary effort is normal.   Musculoskeletal:         General: Normal range of motion.      Right lower leg: No edema.      Left lower leg: No edema.   Skin:     General: Skin is warm and dry.   Neurological:      General: No focal deficit present.      Mental Status: He is alert and oriented to person, place, and time.   Psychiatric:         Mood and Affect: Mood " normal.         Behavior: Behavior normal.         Current Outpatient Medications   Medication Instructions    benztropine (COGENTIN) 1 mg, Oral, Daily    busPIRone (BUSPAR) 15 mg, 3 times daily    carbidopa-levodopa  mg (SINEMET)  mg per tablet TWO TABLETS EVERY MORNING, TWO TABLETS AT NOON, ONE TABLET EVERY EVENING &  ONE TABLET AT 9pm    cetirizine (ZYRTEC) 10 mg, Oral, Daily PRN    FLUoxetine 20 MG capsule 1 capsule, Daily    fluticasone propionate (FLONASE) 50 mcg/actuation nasal spray 1 spray, 2 times daily    latanoprost (XALATAN) 0.005 % ophthalmic solution 1 drop, Both Eyes, Nightly    metoprolol succinate (TOPROL-XL) 25 mg, Oral, Daily    oxymetazoline (VICKS SINEX ULTRA FINE MIST 12) 0.05 % Mist   2 spray(s), PRN PRN congestion, 0 Refill(s)    pantoprazole (PROTONIX) 40 mg, Oral, Daily    rosuvastatin (CRESTOR) 10 mg, Oral, Daily        All medications, laboratory studies, cardiac diagnostic imaging reviewed.     Lab Results   Component Value Date    TRIG 88 05/05/2025    TRIG 77 11/05/2024    CREATININE 1.24 05/05/2025    K 4.2 05/05/2025        ASSESSMENT/PLAN:  Atrial fibrillation with rapid ventricular response  - No change since last office visit   - Stable and asymptomatic- has rare palpitations that occur with anxiety and stress  - Occasional lightheadedness and dizziness with positional changes- no syncope or near syncope  - Currently on metoprolol succinate 25 mg daily and tolerating well for the most part- he believes that recently he has had some difficulty urinating after taking this medication.  Unclear if this is related to metoprolol or not.  Would recommend a urology workup per PCP  - [id number] score 0 indicating a 0.2% stroke risk per year  - In AFIB today - EKG revealed AFib with premature ventricular or aberrantly conducted complexes, vent rate 83 beats per minute    Dizziness  - NO change in his symptoms since last office visit   - Reports ongoing dizziness and  lightheadedness that typically occurs with quick changes in positions.  States that the symptoms usually occur when going from lying down or sitting to standing very quickly.  - Carotid ultrasound completed in September 2023 did not reveal any hemodynamically stenosis of bilateral internal carotid arteries.  Not suggestive NORBERT  - Discussed the importance of changing positions slowly, maintaining adequate hydration with water, and potentially compression socks for further vascular support in lower extremities    Mixed hyperlipidemia  - LDL 91 (5.5.25)  - On Crestor 10 MG daily   - Counseled on low-cholesterol, heart healthy diet exercise as tolerated     GERD  - Management per PCP    Anxiety  - Management per PCP  - Patient states that he uses relaxation techniques to help him calm down, in addition to his medication    /80 on recheck     BL Lower Extremity Edema  - Reports that is it not severe  - Has compression socks which do seem to help   - Will order venous insufficiency US   - has tortuous varicose veins on exam  - Continue with compression stockings, low-sodium diet, and leg elevation      Complete venous insufficiency ultrasounds   Follow up in cardiology clinic in 6 months or sooner if needed  Follow up with PCP as directed   Please notify clinic if any new concerns or any change in symptoms

## 2025-08-06 ENCOUNTER — HOSPITAL ENCOUNTER (OUTPATIENT)
Dept: RADIOLOGY | Facility: HOSPITAL | Age: 60
Discharge: HOME | End: 2025-08-06
Payer: COMMERCIAL

## 2025-08-06 ENCOUNTER — OFFICE VISIT (OUTPATIENT)
Dept: INTERNAL MEDICINE | Facility: CLINIC | Age: 60
End: 2025-08-06
Payer: COMMERCIAL

## 2025-08-06 VITALS
SYSTOLIC BLOOD PRESSURE: 106 MMHG | HEIGHT: 71 IN | HEART RATE: 70 BPM | TEMPERATURE: 98 F | BODY MASS INDEX: 32.76 KG/M2 | DIASTOLIC BLOOD PRESSURE: 72 MMHG | RESPIRATION RATE: 18 BRPM | OXYGEN SATURATION: 96 % | WEIGHT: 234 LBS

## 2025-08-06 DIAGNOSIS — Z12.5 SCREENING FOR MALIGNANT NEOPLASM OF PROSTATE: ICD-10-CM

## 2025-08-06 DIAGNOSIS — Z72.3 LACK OF PHYSICAL ACTIVITY: ICD-10-CM

## 2025-08-06 DIAGNOSIS — G20.A1 PARKINSON'S DISEASE, UNSPECIFIED WHETHER DYSKINESIA PRESENT, UNSPECIFIED WHETHER MANIFESTATIONS FLUCTUATE: ICD-10-CM

## 2025-08-06 DIAGNOSIS — Z00.00 ENCOUNTER FOR PREVENTIVE HEALTH EXAMINATION: Primary | ICD-10-CM

## 2025-08-06 DIAGNOSIS — Z12.11 SCREENING FOR MALIGNANT NEOPLASM OF COLON: ICD-10-CM

## 2025-08-06 DIAGNOSIS — R60.0 BILATERAL LOWER EXTREMITY EDEMA: ICD-10-CM

## 2025-08-06 DIAGNOSIS — I83.813 VARICOSE VEINS OF BOTH LOWER EXTREMITIES WITH PAIN: ICD-10-CM

## 2025-08-06 DIAGNOSIS — Z91.81 AT RISK FOR FALLS: ICD-10-CM

## 2025-08-06 DIAGNOSIS — E66.01 MORBID OBESITY: ICD-10-CM

## 2025-08-06 LAB
LEFT GREAT SAPHENOUS DISTAL THIGH DIA: 0.63 CM
LEFT GREAT SAPHENOUS DISTAL THIGH REFLUX: 4163 MS
LEFT GREAT SAPHENOUS JUNCTION DIA: 1.14 CM
LEFT GREAT SAPHENOUS KNEE DIA: 0.5 CM
LEFT GREAT SAPHENOUS KNEE REFLUX: 4022 MS
LEFT GREAT SAPHENOUS MIDDLE THIGH DIA: 0.35 CM
LEFT GREAT SAPHENOUS PROXIMAL CALF DIA: 0.22 CM
LEFT SMALL SAPHENOUS KNEE DIA: 0.23 CM
LEFT SMALL SAPHENOUS SPJ DIA: 0.14 CM
RIGHT GREAT SAPHENOUS DISTAL THIGH DIA: 0.28 CM
RIGHT GREAT SAPHENOUS DISTAL THIGH REFLUX: 4251 MS
RIGHT GREAT SAPHENOUS JUNCTION DIA: 1.28 CM
RIGHT GREAT SAPHENOUS KNEE DIA: 0.32 CM
RIGHT GREAT SAPHENOUS MIDDLE THIGH DIA: 0.6 CM
RIGHT GREAT SAPHENOUS PROXIMAL CALF DIA: 0.23 CM
RIGHT SMALL SAPHENOUS KNEE DIA: 0.41 CM
RIGHT SMALL SAPHENOUS KNEE REFLUX: 4001 MS
RIGHT SMALL SAPHENOUS SPJ DIA: 0.5 CM
RIGHT SMALL SAPHENOUS SPJ REFLUX: 4063 MS

## 2025-08-06 PROCEDURE — 1159F MED LIST DOCD IN RCRD: CPT | Mod: CPTII,,,

## 2025-08-06 PROCEDURE — 3078F DIAST BP <80 MM HG: CPT | Mod: CPTII,,,

## 2025-08-06 PROCEDURE — 3074F SYST BP LT 130 MM HG: CPT | Mod: CPTII,,,

## 2025-08-06 PROCEDURE — 99215 OFFICE O/P EST HI 40 MIN: CPT | Mod: PBBFAC,25

## 2025-08-06 PROCEDURE — 1160F RVW MEDS BY RX/DR IN RCRD: CPT | Mod: CPTII,,,

## 2025-08-06 PROCEDURE — 93970 EXTREMITY STUDY: CPT | Mod: TC

## 2025-08-06 PROCEDURE — G0438 PPPS, INITIAL VISIT: HCPCS | Mod: ,,,

## 2025-08-06 NOTE — ASSESSMENT & PLAN NOTE
Body mass index is 32.64 kg/m².  Goal BMI <30.  Aerobic exercise 150 minutes per week.  Avoid soda, simple sugars, sweets, excessive rice, pasta, potatoes or bread.   Choose brown options when available and portion control.  Limit fast foods and fried foods.   Choose complex carbs in moderation (ex: green, leafy vegetables, beans, oatmeal).  Eat plenty of fresh fruits and vegetables with lean meats daily.   Consider permanent healthy lifestyle changes.

## 2025-08-06 NOTE — ASSESSMENT & PLAN NOTE
Provided patient with a 5-10 year written screening schedule and personal prevention plan.  Recommendations were developed using the USPSTF age appropriate recommendations.   Fall prevention: Home safety tips to prevent falls (e.g., removing rugs, adding grab bars). Importance of strength and balance exercises. Reviewing medications that may increase fall risk.  Nutrition and Physical activity: Dietary advice for managing or preventing chronic conditions. Benefits of physical activity (at least 150 mins per week) and types of exercises suited to the patient's age and condition.  Medication management: Proper use of current medications. Potential drug interactions and side effects. Importance of a complete medication list, including supplements.  Advanced planning: Discussion of advance directives, power of , living [place]. Encouragement to document and communicate healthcare wishes. Provided a blank copy of living will and power of  to patient.   Education, counseling, and referrals were provided as needed.   After Visit Summary printed and provided to patient, this also includes a list of additional screenings/tests needs.

## 2025-08-06 NOTE — PROGRESS NOTES
[facility]      PATIENT NAME: Reza Wellington   : 1965  DATE: 25   MRN: [id number]      Reason for Visit/Chief Complaint   Medicare Annual Wellness Visit and Comprehensive Health Risk Assessment.     History of Present Illness (HPI)     Reza Wellington is a 59 y.o. male here today for a Medicare Annual Wellness Visit and Comprehensive Health Risk Assessment. Chronic conditions: A-Fib, Parkinson's Disease, Anxiety/Depression, Chronic Neck/Lumbar Pain, Obesity, Hx of Umbilical Hernia. Sister present during visit.     Patient is managed by:  [facility] cardiology clinic for Afib. Last office visit on 2025.  CV US BL Doppler Carotid 9.8.23  The right internal carotid artery demonstrated no hemodynamically significant stenosis.  There is no substantial right side carotid plaque identified.  The right vertebral artery is patent with antegrade flow.  The left internal carotid artery demonstrated no hemodynamically significant stenosis.  There is no substantial left side carotid plaque identified.  The left vertebral artery is patent with antegrade flow.      EKG 7.27.23  NSR. Vent rate 64 BPM.      Exercise Stress Test 9.8.21  The patient exercised according to the KAILA for 6:00 min:s, achieving a work level of Max. METS: 7.00.  The resting heart rate of 71 bpm ev to a maximal heart rate of 146 bpm. This value represents 89 % of the  maximal, age-predicted heart rate. The resting blood pressure of 137/112 mmHg , ev to a maximum blood  pressure of 157/99 mmHg. The exercise test was stopped due to Target heart rate achieved.  Interpretation  Summary: Resting ECG: normal.  Chest Pain: none.     [facility] neurology clinic for tremor disorder. Last office visit on 12/3/2024. Benztropine 1 mg BID (10/5/2022 to present)  Sinemet 25mg - 100mg QID (2021 - present) - 2 tab at 8 AM - 2 tab at 12 PM - 1 tab at 4PM - 1 tab at 9:30 PM.      [facility] ophthalmology clinic for right epidermal cyst. Last  office visit on 4/15/2025.     Patient presents today for Medicare wellness visit. He reports ongoing urination issues associated with Metoprolol, experiencing difficulty urinating when taking the medication, describing the sensation as similar to the effect of an antihistamine. He was not taking Metoprolol at bedtime as originally directed, which may be contributing to urinary symptoms. He acknowledges previously finding glucose in his urine during prior testing.     His previous pain in legs with lifting has now resolved with no current complaints related to lifting activities. He reports chronic lower back pain persisting for years, which he attributes to prolonged work on concrete floors at [employer]. He previously underwent physical therapy for this condition and utilizes a back brace at times, which provides some relief and support. He denies chest pain, shortness of breath, and palpitations.     Denies smoking, drinking, or illicit drug use. Denies chest pain, shortness of breath, cough, headache, dizziness, weakness, abdominal pain, nausea, vomiting, diarrhea, constipation, dysuria, depression, anxiety, SI, and HI.     Prostate Cancer Screening - Last PSA - 0.41 on 11/5/2024. Follow up annually.   Colon Cancer Screening - Cologuard negative on 8/5/2022. Repeat in 3 years. (Cologuard ordered 8/6/2025).  [facility] Exam -[facility] ophthalmology clinic.    Dental Exam - Several years. List of local dentists given to patient.   Vaccinations: Flu - 11/5/2024 / Shingles - Declines / Tetanus - 6/29/2024     A separate E/M code has been provided to evaluate additional complaints that the patient would like addressed during the dedicated Medicare Wellness Exam.      Medical / Social / Family History     Past Medical History:   Diagnosis Date    Anxiety     Atrial fibrillation     Chronic cervical pain     Chronic lumbar pain     Depression     Obesity, unspecified     Parkinson's disease     Umbilical hernia           Past  Surgical History:   Procedure Laterality Date    HERNIA REPAIR      KNEE SURGERY          Social History  Reza Lima  reports that he has never smoked. He has never been exposed to tobacco smoke. He has never used smokeless tobacco. He reports that he does not currently use alcohol. He reports that he does not use drugs.     Family History  Reza Lima family history includes Arthritis in his mother; Cancer in his maternal aunt; Depression in his paternal aunt; Diabetes in his mother; Hypertension in his paternal aunt; Kidney disease in his mother; Learning disabilities in his daughter.       Medications, Allergies, Immunizations     Medications:  Current Outpatient Medications   Medication Instructions    benztropine (COGENTIN) 1 mg, Oral, Daily    busPIRone (BUSPAR) 15 mg, 3 times daily    carbidopa-levodopa  mg (SINEMET)  mg per tablet TWO TABLETS EVERY MORNING, TWO TABLETS AT NOON, ONE TABLET EVERY EVENING &  ONE TABLET AT 9pm    cetirizine (ZYRTEC) 10 mg, Oral, Daily PRN    FLUoxetine 20 MG capsule 1 capsule, Daily    fluticasone propionate (FLONASE) 50 mcg/actuation nasal spray 1 spray, 2 times daily    latanoprost (XALATAN) 0.005 % ophthalmic solution 1 drop, Both Eyes, Nightly    metoprolol succinate (TOPROL-XL) 25 mg, Oral, Daily    oxymetazoline (VICKS SINEX ULTRA FINE MIST 12) 0.05 % Mist   2 spray(s), PRN PRN congestion, 0 Refill(s)    pantoprazole (PROTONIX) 40 mg, Oral, Daily    rosuvastatin (CRESTOR) 10 mg, Oral, Daily        Allergies:  Review of patient's allergies indicates:  No Known Allergies     Immunizations:  Immunization History   Administered Date(s) Administered    COVID-19, MRNA, LN-S, PF (Pfizer) (Purple Cap) 03/06/2021, 03/27/2021, 11/30/2021    COVID-19, mRNA, LNP-S, bivalent booster, PF (PFIZER OMICRON) 11/16/2022    Influenza - Quadrivalent 11/25/2020    Influenza - Quadrivalent - PF *Preferred* (6 months and older) 11/16/2022, 01/18/2024    Influenza -  "Trivalent - Afluria, Fluzone MDV 11/16/2022    Influenza - Trivalent - Fluarix, Flulaval, Fluzone, Afluria - PF 11/05/2024    Pneumococcal Conjugate - 20 Valent 05/05/2025    Tdap 06/29/2021    Zoster 06/29/2021    Zoster Recombinant 01/13/2023        Subjective:     Review of Systems     Review of Systems   Constitutional: Negative.    HENT: Negative.     Eyes: Negative.    Respiratory: Negative.     Cardiovascular: Negative.    Gastrointestinal: Negative.    Endocrine: Negative.    Genitourinary: Negative.    Musculoskeletal: Negative.    Skin: Negative.    Allergic/Immunologic: Negative.    Neurological: Negative.    Hematological: Negative.    Psychiatric/Behavioral: Negative.     All other systems reviewed and are negative.       Objective:     Physical Examination   Visit Vitals  /72 (BP Location: Right arm, Patient Position: Sitting)   Pulse 70   Temp 98 °F (36.7 °C) (Oral)   Resp 18   Ht 5' 11" (1.803 m)   Wt 106.1 kg (234 lb)   SpO2 96%   BMI 32.64 kg/m²        Physical Exam  Vitals reviewed.   Constitutional:       Appearance: Normal appearance. He is obese.   HENT:      Head: Normocephalic.      Nose: Nose normal.      Mouth/Throat:      Mouth: Mucous membranes are moist.      Pharynx: Oropharynx is clear.   Eyes:      Extraocular Movements: Extraocular movements intact.      Conjunctiva/sclera: Conjunctivae normal.      Pupils: Pupils are equal, round, and reactive to light.   Cardiovascular:      Rate and Rhythm: Normal rate and regular rhythm.      Heart sounds: Normal heart sounds.   Pulmonary:      Effort: Pulmonary effort is normal.      Breath sounds: Normal breath sounds.   Abdominal:      General: Abdomen is flat. Bowel sounds are normal.      Palpations: Abdomen is soft.   Musculoskeletal:         General: Normal range of motion.      Cervical back: Normal range of motion.   Skin:     General: Skin is warm and dry.      Capillary Refill: Capillary refill takes less than 2 seconds. "   Neurological:      General: No focal deficit present.      Mental Status: He is alert and oriented to person, place, and time.   Psychiatric:         Mood and Affect: Mood normal.            Assessment/Plan (Including Health Maintenance and Risk Assessment):     Assessment/Plan     Problem List Items Addressed This Visit       Parkinson's disease (Chronic)    Current Assessment & Plan   Patient followed by [facility].  Current tx: Benztropine 1 mg BID (10/5/2022 to present)  Sinemet 25mg - 100mg QID (5/6/2021 - present) - 2 tab at 8 AM - 2 tab at 12 PM - 1 tab at 4PM - 1 tab at 9:30 PM.          Screening for malignant neoplasm of prostate    Relevant Orders    PSA, Screening    Encounter for preventive health examination - Primary    Current Assessment & Plan    Provided patient with a 5-10 year written screening schedule and personal prevention plan.  Recommendations were developed using the USPSTF age appropriate recommendations.   Fall prevention: Home safety tips to prevent falls (e.g., removing rugs, adding grab bars). Importance of strength and balance exercises. Reviewing medications that may increase fall risk.  Nutrition and Physical activity: Dietary advice for managing or preventing chronic conditions. Benefits of physical activity (at least 150 mins per week) and types of exercises suited to the patient's age and condition.  Medication management: Proper use of current medications. Potential drug interactions and side effects. Importance of a complete medication list, including supplements.  Advanced planning: Discussion of advance directives, power of , living [place]. Encouragement to document and communicate healthcare wishes. Provided a blank copy of living will and power of  to patient.   Education, counseling, and referrals were provided as needed.   After Visit Summary printed and provided to patient, this also includes a list of additional screenings/tests needs.           Morbid obesity    Current Assessment & Plan   Body mass index is 32.64 kg/m².  Goal BMI <30.  Aerobic exercise 150 minutes per week.  Avoid soda, simple sugars, sweets, excessive rice, pasta, potatoes or bread.   Choose brown options when available and portion control.  Limit fast foods and fried foods.   Choose complex carbs in moderation (ex: green, leafy vegetables, beans, oatmeal).  Eat plenty of fresh fruits and vegetables with lean meats daily.   Consider permanent healthy lifestyle changes.          Lack of physical activity    Current Assessment & Plan   150 mins of physical activity recommended weekly.            At risk for falls    Current Assessment & Plan   Wear shoes with rubber soles that do not have an opening at the toe.  Keep inside and outside of house well lit.  Use night lights throughout home.  Remove clutter from floors.  Clean up spills immediately.  Do not climb on stools or step ladders, if possible.  Put grab bars by tub, shower and toilet.  Put handrails on both sides of stairs/stairway.          Screening for malignant neoplasm of colon    Relevant Orders    Cologuard Screening (Multitarget Stool DNA)           Health Maintenance     Health Maintenance Due   Topic Date Due    RSV Vaccine (Age 60+ and Pregnant patients) (1 - Risk 60-74 years 1-dose series) Never done    COVID-19 Vaccine (5 - 2024-25 season) 09/01/2024    Colorectal Cancer Screening  08/08/2025        All of your core healthy metrics are met.     Health Maintenance Topics with due status: Not Due       Topic Last Completion Date    TETANUS VACCINE 06/29/2021    PROSTATE-SPECIFIC ANTIGEN 11/05/2024    Influenza Vaccine 11/05/2024    Hemoglobin A1c (Diabetic Prevention Screening) 11/05/2024    Lipid Panel 05/05/2025        Risk Assessment     A comprehensive HEALTH RISK ASSESSMENT was completed today. Results are summarized below:    The following EMOTIONAL/SOCIAL CONCERNS were identified on today's screening for Social  Isolation, Depression and Anxiety:  *Patient reports his health has LIMITED his SOCIAL INTERACTIONS. (During the past four weeks, has your physical and/or emotional health limited your social activities with family, friends, neighbors, or groups?: (!) Yes (TO SOME EXTENT))  <repeat PHQ-9>   There are NO COGNITIVE FUNCTION CONCERNS identified on today's screening.  The following FUNCTIONAL AND/OR SAFETY CONCERNS were identified on today's screening for Physical Symptoms, Nutritional, Home Safety/Living Situation, Fall Risk, Activities of Daily Living, Independent Activities of Daily Living, Physical Activity,Timed Up and Go test and Whisper test::  *Patient reports PHYSICAL ACTIVITY LIMITED BY PAIN/FATIGUE. (In the past four weeks have you your activities been limited by pain, tiredness or fatigue?: (!) Yes (pain))   *Patient reports NO PREVENTIVE HOME HAZARD EVALUATION OR MODIFICATION. (Have you or someone else evaluated or modified your home with additional safety features like handrails on all stairs, installed grab bars in the bathroom, secured loose rugs and ensured good lighting in all areas?: (!) No)    The patient reports NO OPIOID PRESCRIPTIONS. This was confirmed through medication reconciliation.    The patient is NOT A TOBACCO USER.  The patient reports NO SIGNIFICANT ALCOHOL USE.     All Questions regarding food, transportation or housing were not answered today.    The patient was asked and declined the use of a free .          Opioid Screening: Patient medication list reviewed, patient is not taking prescription opioids. Patient is not using additional opioids than prescribed. Patient is at low risk of substance abuse based on this opioid use history.       Provided patient with a 5-10 year written screening schedule and personal prevention plan.   Recommendations were developed using the USPSTF age appropriate recommendations.   Education, counseling, and referrals were provided as needed.    After Visit Summary printed and given to patient, which includes a list of additional screenings/tests needed.    Patient Care Team:  Jenni Shukla FNP as PCP - General (Family Medicine)       Future Appointments   Date Time Provider Department Center   8/19/2025  2:00 PM PROVIDERS, [facility]    12/5/2025  8:45 AM Francoise Barrera MD [facility] NEURO [place]    1/7/2026  7:00 AM Jenni Shukla FNP [facility] INTMED [place]    1/30/2026 10:00 AM Randall Phillip MD [facility] CARD [place]   8/5/2026  1:20 PM Jenni Shukla FNP [facility] INTMED [place]         Follow up in about 1 year (around 8/6/2026), or if symptoms worsen or fail to improve, for Medicare AWV - 40 min time slot.       Signature:      ALESSANDRO Huang      Advance Care Planning   Today we discussed advance care planning. He is interested in learning more about how to make Advance Directives. Information was provided and I offered to discuss more at his discretion.

## 2025-08-06 NOTE — ASSESSMENT & PLAN NOTE
Patient followed by [facility].  Current tx: Benztropine 1 mg BID (10/5/2022 to present)  Sinemet 25mg - 100mg QID (5/6/2021 - present) - 2 tab at 8 AM - 2 tab at 12 PM - 1 tab at 4PM - 1 tab at 9:30 PM.

## 2025-08-06 NOTE — ASSESSMENT & PLAN NOTE
Wear shoes with rubber soles that do not have an opening at the toe.  Keep inside and outside of house well lit.  Use night lights throughout home.  Remove clutter from floors.  Clean up spills immediately.  Do not climb on stools or step ladders, if possible.  Put grab bars by tub, shower and toilet.  Put handrails on both sides of stairs/stairway.

## 2025-08-06 NOTE — PATIENT INSTRUCTIONS
Medicare Annual Wellness Visit      Patient Name: Reza Wellington  Today's Date: 8/6/2025    Below you will find your 5-10 year Screening Plan Recommendations:  Health Maintenance       Date Due Completion Date    RSV Vaccine (Age 60+ and Pregnant patients) (1 - Risk 60-74 years 1-dose series) Never done ---    COVID-19 Vaccine (5 - 2024-25 season) 09/01/2024 11/16/2022    Colorectal Cancer Screening 08/08/2025 8/8/2022    Shingles Vaccine (3 of 3) 11/05/2025 (Originally 3/10/2023) 1/13/2023    Influenza Vaccine (1) 09/01/2025 11/5/2024    PROSTATE-SPECIFIC ANTIGEN 11/05/2025 11/5/2024    Hemoglobin A1c (Diabetic Prevention Screening) 11/05/2027 11/5/2024    Lipid Panel 05/05/2030 5/5/2025    TETANUS VACCINE 06/29/2031 6/29/2021          Below is your summarized Personalized Prevention Plan that addresses any concerns we discussed today at your visit. Please see attached detailed information specific to your Health Concerns.  No orders of the defined types were placed in this encounter.        The following information is provided to all patients.  This information is to help you find additional resources for any problems that may be affecting your health: Living healthy guide: www.[facility].[place].gov      Understanding Diabetes: www.diabetes.org      Eating healthy: www.cdc.gov/healthyweight      CDC home safety checklist: www.cdc.gov/steadi/patient.html      Agency on Aging: www.goea.[place].[facility]      Alcoholics anonymous (AA): www.aa.org      Physical Activity: www.sukhdev.nih.gov/sc6izos      Tobacco use: www.quitwithusla.org                                 Patient Education     Weight Loss Tips   About this topic   More and more people are concerned about their weight. You can choose from many different programs. The goal of a weight loss program may be to cut down on calories or to lose extra weight through exercise.  Losing weight may mean changing your ideas about food. Going on a diet and losing weight does  "not mean starving yourself. It means cutting down on the amount of food you eat, making healthy food choices, and being active.  General   Ideally, you need to lose 1 to 2 pounds (0.5 to 1 kg) a week for a healthy weight loss. Losing too much weight too fast is not good. When you take in fewer calories, you will lose weight. Your ideal calorie and weight goal depends on your current age, weight, height, and personal goals. Ask your doctor or dietitian what your ideal weight is.  You need to burn 3500 calories to lose 1 pound (0.5 kg). That means cutting out 500 calories every day for 7 days. You can cut out 500 calories per day by eating or drinking fewer calories, burning them through exercise, or doing both. To lose that extra weight and stay healthy:  Take time to exercise.  Exercise regularly. Burn calories with activity and exercise. Exercise can help you lose weight and it also strengthens your muscles. Set a schedule where you will have time to do exercises. With just 30 to 60 minutes of exercise each day, you could burn 500 extra calories. Your metabolism stays elevated for a period of time after exercise.  If you don't have time for a 30 minute workout, try three 10 minute exercises each day.  If you work near your home, walk to work. Walking is a very good form of exercise.  Take a 20 minute walk each day. Walk during your lunch break. Park far away, so you have to walk more.  Take the steps instead of elevators. You will burn more calories this way.  If you have an illness, like diabetes or high blood pressure, ask your doctor how much exercise is right for you.  Choose healthy snacks.  Low calorie healthy snacks are a good thing. They help your blood sugar stay even and prevent you from overeating at meals. Choose a balanced snack, such as a small apple with 2 tablespoons (30 grams) of peanut butter.  Keep in mind, even "low calorie" foods can add up. Just because you choose low calorie foods does not " mean you do not have to count the calories you eat.  Pack a few fresh fruits or a small salad to take to work or school. Avoid buying a snack at the nearest vending machine.  When you feel thirsty, drink water. Water has no calories and is a very good thirst quencher.  Plan healthy meals.  Plan ahead. Keep a diary of foods that are low in calories. You can also make a list of meal plans for your breakfast, lunch, and dinner. Planning ahead will prevent you from eating out at a fast food place or restaurant.  Make a grocery list before shopping so you only buy food you need. Don't go to the store hungry.  Visit a dietitian. This person will help you make meal plans that will help you lose weight.  Add fiber to your meals. Adding fiber helps you to feel full for a longer amount of time.  Take care when eating out.  Choose lower fat and lower calorie meals. Try a seafood, lean meat, or vegetarian entrée.  Share a meal with a friend.  Try a salad and appetizer instead of an entree.  Ask for a to-go box when dinner is served and put half of your meal in it for a later meal.  Have fruit for dessert.  Drink water instead of other high calorie drinks.  Learn not to overeat.  Watch your portions. For example, the recommended serving size of meat is 3 ounces (90 grams). This is the size of a deck of playing cards. Two tablespoons (30 grams) of peanut butter is the size of a ping-pong ball. One medium fruit is the size of a baseball.  Use a smaller plate or glass during dinner for less calorie intake.  Try drinking a glass or two of water before eating. This may make you feel more full and help you to eat less.  Eat slowly. Take at least 30 minutes to eat. This gives time for your brain to tell your stomach you are full. This will help you avoid overeating.  Some people eat smaller meals more often to help not to overeat. If you can eat six small meals, make them healthy and low calorie. If three meals are best for you, know  your calorie level for the day and spread it out into three healthy low calorie meals.     What will the results be?   Losing weight may make you healthier. You also may have more energy for your daily activities. You may lower disease risk. You may also add years to your life.  What changes to diet are needed?   Learn how to read nutrition labels. Know the serving size. Knowing the calories in an item will help you make healthier choices and lose weight.  Keep a diary of the food you eat. This will help you count the calories you are taking in.  Make a menu in advance. This will help you make good choices to include in your diet.  Avoid eating 2 hours before bedtime to allow for digestion. If you eat right before you go to bed, you may also have worse heartburn.  Be sure to count the calories in the things you drink. You may want to stop drinking soda pop, beer, wine, and mixed drinks (alcohol). Some coffee drinks also have a lot of calories in them.  Who should use this diet?   A weight loss diet may be needed for people with a calculated body mass index of 25 and over. This means you are overweight or obese.  Who should not use this diet?   People with BMI of 18.5 or lower should not use this diet. Do not use this diet if your doctor does not recommend weight loss.  What foods are good to eat?   Choose foods that are nutritious. Remember, portion control is key. Even a low calorie food can become high in calories if you have too big of a serving. Here is a list of foods that are good to eat:  Vegetables:   Broccoli  Asparagus  Spinach  Green leafy vegetables  Tomatoes  Onions  Mushrooms  Cucumbers  Zucchini  Lean proteins:   Egg whites  Beans including kidney, navy, black, and chickpeas  Grilled, broiled, or baked skinless chicken breast  Grilled, broiled, or baked skinless turkey breast  Lean beef  [place] meat  Grilled, broiled, or baked fish  Beans  Nuts, such as almonds, cashews, and pistachios  Seeds  Whole  grains and carbs:   Oatmeal  Brown rice  Sweet potatoes  Cereal  Whole grain bread or pasta  Fruits:   Apples  Grapefruit  Blueberries  Oranges  Bananas  Grapes  Peaches  Pineapple  Strawberries  Dairy:   Fat-free or low-fat milk and cheese  Low fat yogurt  Soy, rice, or almond milk  What foods should be limited or avoided?   Limit or avoid foods that are high in calories like:  Junk foods  Fried foods  Fatty foods  Processed meats  Food with saturated and trans fat  Whole fat dairy products  Butter  Cheese  Ice cream  Food and drinks with a lot of sugar. Some examples are beer, wine, mixed drinks (alcohol), carbonated sodas, cakes, and cookies.  When do I need to call the doctor?   Weakness  Fast heartbeat  Dizziness  Helpful tips   Join a support group and an exercise group. It is much easier to lose weight if you have support and encouragement.  Do not skip meals. If you skip a meal, you most likely will overeat at that next meal.  Eat at the dining room table instead in front of the TV to help monitor intake.  Last Reviewed Date   2021-08-09  Consumer Information Use and Disclaimer   This generalized information is a limited summary of diagnosis, treatment, and/or medication information. It is not meant to be comprehensive and should be used as a tool to help the user understand and/or assess potential diagnostic and treatment options. It does NOT include all information about conditions, treatments, medications, side effects, or risks that may apply to a specific patient. It is not intended to be medical advice or a substitute for the medical advice, diagnosis, or treatment of a health care provider based on the health care provider's examination and assessment of a patients specific and unique circumstances. Patients must speak with a health care provider for complete information about their health, medical questions, and treatment options, including any risks or benefits regarding use of medications. This  information does not endorse any treatments or medications as safe, effective, or approved for treating a specific patient. UpToDate, Inc. and its affiliates disclaim any warranty or liability relating to this information or the use thereof. The use of this information is governed by the Terms of Use, available at https://www.[employer].com/en/know/clinical-effectiveness-terms   Copyright   Copyright © 2023 UpToDate, Inc. and its affiliates and/or licensors. All rights reserved.    Patient Education     Health Risks of a High BMI   About this topic   Your weight and health depend on a few things. Doctors use a method called BMI or body mass index as a tool to learn more about your risk of having health problems. This tool uses your weight and your height to find your BMI.  BMI does not include things like your habits, where you live, family history, or amount of body fat. Some people with a normal BMI are still not healthy. Other people with a high BMI may be healthy. Most of the time, a person with a higher BMI is less healthy and will need more care. Ask your doctor for their view of your total health during a well visit or physical.  Being overweight or having a high BMI can hurt many parts of your body. Learn about your BMI and how your weight changes your health risks. Then you can make changes to keep yourself as healthy as you can.  General   Weighing too much can be very harmful to your body. It can cause many illnesses and can make it hard to move about.  Blood Sugar   You have a greater chance of having high blood sugar or diabetes if you weigh too much. Your body normally makes a hormone called insulin. The insulin allows your body to use the sugar in your blood.  The cells in your body may not be able to use insulin. Then your cells cannot get the sugar from your bloodstream that they need for energy. Your pancreas has to work extra hard to try and make enough insulin to keep your blood sugar  healthy.  If you lose weight and exercise, your body is able to control your blood sugar levels better. You may not need as much insulin to keep your blood sugar levels healthy.  Your Heart   Being overweight makes your heart work harder.  The blood vessels that bring blood to your heart muscle may become narrow or blocked and cause a heart attack or your heart may not pump as well as it should. Your heart rate may not be normal.  Losing weight can lower your chances of having problems with your heart.  High Blood Pressure   Your heart has to work harder to pump blood through a larger body and to make sure all of your cells have the oxygen they need.  As your heart has to work harder, your blood pressure goes up.  Being overweight can also harm your kidneys and this may also raise your blood pressure.  You may be able to lower your blood pressure through weight loss and routine exercise.  Stroke   Strokes are more likely to happen when someone has high blood pressure, heart problems, high blood sugar, or high cholesterol.  Being overweight puts you at a higher risk for all of these health problems. These problems put you at a higher risk for having a stroke.  Losing weight may help lower your blood pressure, which is the biggest risk factor for a stroke.  Cholesterol   Your cholesterol level is likely to be higher if you are overweight.  This can lead to narrowing of the blood vessels in your heart, neck, or other parts of your body. Then you may have chest pain or signs of low blood flow to a certain area. It can also lead to a heart attack or stroke.  Lowering your weight and changing what you eat may change your cholesterol levels.  Your Liver and Kidneys   You are more likely to have certain problems with your liver or kidneys if you have a high BMI.  Fatty liver disease is caused by a buildup of fat in your liver. Then your liver may not work as well as it should.  You are at a higher risk for diabetes if you  are overweight. This illness can cause kidney problems.  There is no exact way to treat fatty liver disease. By losing weight, you may keep your liver from getting any worse and help your liver work better.  By losing weight, you lower your chance of having kidney problems. If you already have kidney problems, weight loss may help keep your disease from getting worse.  Bone and Joint Problems   Weighing too much can put a lot of stress on your joints.  The extra weight may make the cartilage wear away more quickly from your bones. Your cartilage lines the surfaces of your bones to help your joints glide more easily.  When your cartilage is worn, your joints become stiff and sore.  Losing weight and exercising is one of the best ways to treat joint pain and stiffness. It can also ease the stress on your hips, knees, and back.  Cancer Risk   Gaining weight and poor health habits raise your risk for some kinds of cancer.  Healthy eating and exercise may lower your risk for some kinds of cancer.  Sleep   With a high BMI, you may have extra fat around your neck. This can make your airway smaller and put you at risk for a problem called sleep apnea. With this problem, your breathing starts and stops when you are sleeping.  Signs of sleep apnea include snoring, feeling sleepy during the day, and problems with focusing.  Sleep apnea can lead to heart problems such as increased risk for heart disease and arrhythmias like atrial fibrillation.  Losing weight can lower the amount of fat around your neck and ease sleep apnea problems.  Pregnancy   Your weight can affect how often you have a period. It may also make it harder for you to get pregnant. A high BMI can cause problems for both mom and baby.  If you are overweight and pregnant you are at a higher risk for high blood sugar or high blood pressure while you are pregnant.  You are also more likely to have your baby early or to need a C section.  Losing weight before you  become pregnant may lower your chance for these problems. If you are pregnant, talk to your doctor before you try to lose weight.  Depression   You are more likely to suffer from depression or low mood when you have a high BMI.  You may have a low mood because of low self-esteem, lack of activity, lack of sleep, and health problems caused by being overweight.  Losing weight and finding out the reasons you overeat are some of the steps to improve your quality of life and deal with depression.  Last Reviewed Date   2021-09-15  Consumer Information Use and Disclaimer   This generalized information is a limited summary of diagnosis, treatment, and/or medication information. It is not meant to be comprehensive and should be used as a tool to help the user understand and/or assess potential diagnostic and treatment options. It does NOT include all information about conditions, treatments, medications, side effects, or risks that may apply to a specific patient. It is not intended to be medical advice or a substitute for the medical advice, diagnosis, or treatment of a health care provider based on the health care provider's examination and assessment of a patients specific and unique circumstances. Patients must speak with a health care provider for complete information about their health, medical questions, and treatment options, including any risks or benefits regarding use of medications. This information does not endorse any treatments or medications as safe, effective, or approved for treating a specific patient. Up[employer]Date, Inc. and its affiliates disclaim any warranty or liability relating to this information or the use thereof. The use of this information is governed by the Terms of Use, available at https://www.[employer].com/en/know/clinical-effectiveness-terms   Copyright   Copyright © 2023 Up[employer]Date, Inc. and its affiliates and/or licensors. All rights reserved.    Patient Education     Exercise and Aging    General   Exercise can help older adults prevent falls and stay independent longer. Exercise may also help:  You have stronger muscles and bones and better balance  You lose weight and have more energy  Make your heart and lungs stronger  Lower your chance of health problems like heart disease, high blood sugar, or breast or colon cancer  Control conditions like high blood pressure and diabetes  You manage stress and get better sleep  Your mood, self-esteem, and self-image  How you think, plan, and pay attention  There are four types of exercise: endurance, strength, balance, and flexibility.  Endurance exercises get your heart rate up and keep it up for a while. Most people should get at least 30 minutes of exercise that gets your heart rate up on 5 or more days each week. Walking, swimming, biking, or going up and down stairs are kinds of exercises to get your heart rate up. You do not have to do all 30 minutes at one time. Try doing 10 minutes 3 times a day.  Strength exercises build muscle. Lifting weights or doing knee bends are kinds of strength exercises.  Balance exercises help to prevent falls. Raise up on your toes or stand on one leg as a kind of balance exercise.  Flexibility exercises move your joints through a full range of motion and stretch your muscles. Bend forward in a chair to stretch your back, do stretches, or bend and extend your arms or legs as a kind of flexibility exercise. Try doing 10 minutes twice a week.  Plan to include all these exercise types in your exercise program. Always check with your doctor before you start a new exercise program.  Some people do not like formal exercise classes, but there are many ways to work your muscles each day. Here are some things you can do.  Use steps instead of elevators.  Park far away in parking lots to walk farther.  Use the time while you wait. Do knee bends as you hold onto the kitchen counter while you wait for your coffee to brew.  When you  unload groceries, lift the bags or a container of milk a few times to exercise your arms and legs.  Walk the long way when you go somewhere.  After you walk to the bathroom, walk a few laps around the house before sitting down.  Try doing different standing exercises after you wash your hands each time in the bathroom.  Do balance exercises while you brush your teeth.  Do an exercise or get up and walk during TV commercials.  Don't forget to exercise small muscle groups like your hands, fingers, ankles, toes, and neck. Wiggle, bend, flex, and rotate these joints from left to right and back to front to help to keep these joints flexible.  When do I need to call the doctor?   Stop exercising and talk to your doctor if you have any of these problems:  Dizziness  Shortness of breath  Pain or pressure in the chest, arms, throat, jaw, or back  Nausea or vomiting  Blood clots  Infection  Joint swelling  Open wounds  Recent hip, back, or eye surgery  New problems that start during exercise  Helpful tips   If you have a health problem like heart disease or diabetes, talk with your doctor about the best exercises for you.  Always warm up before you stretch. Heated muscles stretch much easier than cool muscles. Try to walk or bike at an easy pace for a few minutes to warm up your muscles.  Slow your pace again after you exercise to cool down and bring your heart rate down slowly.  Be sure you do not hold your breath when you exercise because it can raise your blood pressure. If you tend to hold your breath, try to count out loud when you exercise.  Never bounce when doing stretches. Use slow and steady motions and hold your stretch for 20 to 30 seconds.  Have a routine. Doing exercises before a meal may be a good way to get into a routine.  Set small goals for yourself when you start to exercise. Use a chart to see how much you are doing. Ask someone to exercise with you.  Exercise may be slightly uncomfortable, but you should  not have sharp pains. If you do get sharp pains, stop what you are doing. If the sharp pains continue, call your doctor.  Drink plenty of fluids without caffeine when you exercise and afterwards. Avoid outdoor exercise if it is too cold or too hot.  Wear the right clothes and shoes. Try layers of clothes, so that you can take them off if you get too hot. Shoes should fit well and support your feet.  Last Reviewed Date   2021-03-18  Consumer Information Use and Disclaimer   This generalized information is a limited summary of diagnosis, treatment, and/or medication information. It is not meant to be comprehensive and should be used as a tool to help the user understand and/or assess potential diagnostic and treatment options. It does NOT include all information about conditions, treatments, medications, side effects, or risks that may apply to a specific patient. It is not intended to be medical advice or a substitute for the medical advice, diagnosis, or treatment of a health care provider based on the health care provider's examination and assessment of a patients specific and unique circumstances. Patients must speak with a health care provider for complete information about their health, medical questions, and treatment options, including any risks or benefits regarding use of medications. This information does not endorse any treatments or medications as safe, effective, or approved for treating a specific patient. UpToDate, Inc. and its affiliates disclaim any warranty or liability relating to this information or the use thereof. The use of this information is governed by the Terms of Use, available at https://www.[employer].com/en/know/clinical-effectiveness-terms   Copyright   Copyright © 2023 UpToDate, Inc. and its affiliates and/or licensors. All rights reserved.    Patient Education       Preventing Falls in the Older Adult   About this topic   A fall is the sudden loss of balance that causes a person  to drop to the ground or floor. Falls are a serious health risk and they happen more often as we get older. Many things may increase your risk of falling, like:  Problems that come with getting older  Muscle weakness  Balance problems  More trouble seeing  Personal health factors  Health conditions such as arthritis, Parkinson's disease, low blood pressure, or stroke  Medicines you take  Loss of feeling in your feet  Being less active  Taking drugs that makes you dizzy or drowsy  Habits like alcohol use  Things around your house  Slippery floors  Unsecured rugs  Stairs  Wearing improper fitting shoes  Areas where it is dark and difficult to see  Incorrect size or type of assistive devices  Clutter and items on the floor that block your walkway     What will the results be?   Prevent future falls  Avoid injuries and disabilities  Improve overall health  Will there be any other care needed?   Ask your doctor if you need to take vitamin D to help keep your bones strong.  Make your home safer. Get rid of things that might make you trip or slip. These are things like loose rugs, electrical cords, or clutter. Add grab bars, a shower seat, and handrails.  Wear sturdy shoes that fit well. Shoes should fit well, have a low heel, and the soles of the shoe should not be slippery. Walking in socks or with bare feet can raise your chance for falling.  Stay active. Walk, garden, swim, or do something active on a regular basis. These activities may prevent you from getting hurt if you do fall. They also help with your strength and balance.  Use a cane, walker, or other safety device. Be sure it is the right size for you and that you know how to use it safely. Be sure to wear your eyeglasses if they have been ordered for you.  Get up slowly after you sit or lie down. Try to change positions slowly.  What to do if you fall:  Stay calm and do not panic.  Look for signs to decide if you have been hurt or have an injury.  If you think  you can get up safely, try to get up.  If you are hurt or cannot get up on your own, try to get help.  If no one is available to help, try to get comfortable and wait for someone to arrive who can help you.  Stay warm and move regularly as you are able. Avoid putting too much pressure on any one area.  After a fall, tell family and friends that you have fallen. It is also important to talk to your doctor about your fall right away.  What problems could happen?   A fall can lead to broken bones and other serious injuries in older adults. Problems that happen because of a fall may even lead to death in older adults. Many people are not able to return to their former level of activity after a fall.  What can be done to prevent this health problem?   Lower Your Risk of Falling  Wear your eyeglasses. Have regular eye checkups. Do not use reading glasses when you walk around.  Quit smoking and limit alcohol intake. Smoking and too much alcohol can decrease bone mass and increase the chance of broken bones.  Know the side effects of the drugs you are taking. Some drugs may affect your balance and cause confusion or sleepiness.  Get up slowly after you sit or lie down. Do not change positions quickly. Do not rush when you need to go to the bathroom or to answer the phone.  Stay Physically Active  Be physically active. This will help to improve your strength and balance.  Fear of falling may lead you to avoid activities. Talk to your doctor. You may be sent to a physical therapist. This person can help you improve balance and build your confidence. Getting rid of your fear of falling can help you stay active and prevent future falls.  Join an exercise program. Ask your doctor what exercise is safe for you. Be sure to ask before you do any exercises, especially if you have illnesses like arthritis. Exercise can help you keep muscles strong and help with your balance. It is also a good way to learn proper ways to do each  activity or exercise.  Safety Tips at Home  Keep your floors and walking areas clear from clutter. Remove furniture that blocks your way. Secure cords and wires near the wall to avoid tripping over them. Get rid of throw rugs.  Be sure the lights in your house are working well and provide good lighting throughout your home. Make sure you can reach switches and lamps easily. Place a lamp close to your bed that is easy to reach.  Fix all steps and sidewalks to make them smooth and even. Put handrails and lights on stairs.  Keep all the things you use often on low shelves or in cabinets that are at about waist level. Ask for help to move items off of high shelves. Do not use a chair as a step stool.  Keep your bathroom area safe. Use nonslip rubber mats on the floor and in the tub or shower.  Keep a phone near you in case of emergency. Keep a list of your emergency contact numbers in large print near your phone. Carry a phone with you when you go for a walk. Consider using a personal alarm device that could call for help in case you fall and cannot get up.  Think about protecting your hip. Hip protectors may be needed if you have a higher chance for falling. Ask your doctor about this.  Where can I learn more?   [employer]  https://familydoctor.org/ytqvd-mny-ak-lower-your-risk/   [facility]  https://www.nhs.uk/conditions/falls/prevention/   Last Reviewed Date   2021-06-07  Consumer Information Use and Disclaimer   This information is not specific medical advice and does not replace information you receive from your health care provider. This is only a brief summary of general information. It does NOT include all information about conditions, illnesses, injuries, tests, procedures, treatments, therapies, discharge instructions or life-style choices that may apply to you. You must talk with your health care provider for complete information about your health and treatment options. This information  should not be used to decide whether or not to accept your health care providers advice, instructions or recommendations. Only your health care provider has the knowledge and training to provide advice that is right for you.  Copyright   Copyright © 2021 [employer] and its affiliates and/or licensors. All rights reserved.

## 2025-08-07 ENCOUNTER — TELEPHONE (OUTPATIENT)
Dept: CARDIOLOGY | Facility: CLINIC | Age: 60
End: 2025-08-07
Payer: COMMERCIAL

## 2025-08-07 NOTE — TELEPHONE ENCOUNTER
Spoke with the patient and his wife regarding results of venous insufficiency US. Offered referral to Vascular Surgery, they would like to defer for now. They will continue with conservative care including compression socks, low sodium diet, and leg elevation. Voiced understanding with the plan. All questions answered.      Liliana Santos PA-C

## 2025-08-11 PROBLEM — Z12.11 SCREENING FOR MALIGNANT NEOPLASM OF COLON: Status: RESOLVED | Noted: 2025-08-06 | Resolved: 2025-08-11

## 2025-08-19 ENCOUNTER — OFFICE VISIT (OUTPATIENT)
Dept: OPHTHALMOLOGY | Facility: CLINIC | Age: 60
End: 2025-08-19
Payer: COMMERCIAL

## 2025-08-19 VITALS — BODY MASS INDEX: 32.72 KG/M2 | WEIGHT: 233.69 LBS | HEIGHT: 71 IN

## 2025-08-19 DIAGNOSIS — H40.1134 PRIMARY OPEN ANGLE GLAUCOMA (POAG) OF BOTH EYES, INDETERMINATE STAGE: Primary | ICD-10-CM

## 2025-08-19 DIAGNOSIS — H40.9 GLAUCOMA OF BOTH EYES, UNSPECIFIED GLAUCOMA TYPE: ICD-10-CM

## 2025-08-19 PROCEDURE — 99213 OFFICE O/P EST LOW 20 MIN: CPT | Mod: PBBFAC,PN,25

## 2025-08-19 PROCEDURE — 92082 INTERMEDIATE VISUAL FIELD XM: CPT | Mod: PBBFAC,PN

## 2025-08-19 RX ORDER — PROPARACAINE HYDROCHLORIDE 5 MG/ML
1 SOLUTION/ DROPS OPHTHALMIC
Status: COMPLETED | OUTPATIENT
Start: 2025-08-19 | End: 2025-08-19

## 2025-08-19 RX ADMIN — PROPARACAINE HYDROCHLORIDE 1 DROP: 5 SOLUTION/ DROPS OPHTHALMIC at 02:08

## 2025-08-21 PROBLEM — H40.1134 PRIMARY OPEN ANGLE GLAUCOMA (POAG) OF BOTH EYES, INDETERMINATE STAGE: Status: ACTIVE | Noted: 2025-08-21
